# Patient Record
Sex: FEMALE | Race: WHITE | Employment: FULL TIME | URBAN - NONMETROPOLITAN AREA
[De-identification: names, ages, dates, MRNs, and addresses within clinical notes are randomized per-mention and may not be internally consistent; named-entity substitution may affect disease eponyms.]

---

## 2017-03-16 ENCOUNTER — OFFICE VISIT (OUTPATIENT)
Dept: FAMILY MEDICINE CLINIC | Age: 52
End: 2017-03-16

## 2017-03-16 VITALS
BODY MASS INDEX: 39.71 KG/M2 | HEART RATE: 60 BPM | HEIGHT: 64 IN | RESPIRATION RATE: 12 BRPM | SYSTOLIC BLOOD PRESSURE: 122 MMHG | DIASTOLIC BLOOD PRESSURE: 63 MMHG | TEMPERATURE: 98.2 F | WEIGHT: 232.6 LBS

## 2017-03-16 DIAGNOSIS — M54.50 ACUTE LEFT-SIDED LOW BACK PAIN WITHOUT SCIATICA: Primary | ICD-10-CM

## 2017-03-16 DIAGNOSIS — I10 ESSENTIAL HYPERTENSION: ICD-10-CM

## 2017-03-16 PROBLEM — F32.A DEPRESSION: Status: ACTIVE | Noted: 2017-03-16

## 2017-03-16 PROCEDURE — 96372 THER/PROPH/DIAG INJ SC/IM: CPT | Performed by: NURSE PRACTITIONER

## 2017-03-16 PROCEDURE — 99203 OFFICE O/P NEW LOW 30 MIN: CPT | Performed by: NURSE PRACTITIONER

## 2017-03-16 RX ORDER — LISINOPRIL 20 MG/1
20 TABLET ORAL DAILY
COMMUNITY
End: 2017-06-15 | Stop reason: ALTCHOICE

## 2017-03-16 RX ORDER — ETODOLAC 400 MG/1
400 TABLET, FILM COATED ORAL 2 TIMES DAILY
Qty: 60 TABLET | Refills: 0 | Status: SHIPPED | OUTPATIENT
Start: 2017-03-16 | End: 2017-06-15 | Stop reason: ALTCHOICE

## 2017-03-16 RX ORDER — KETOROLAC TROMETHAMINE 30 MG/ML
30 INJECTION, SOLUTION INTRAMUSCULAR; INTRAVENOUS ONCE
Status: COMPLETED | OUTPATIENT
Start: 2017-03-16 | End: 2017-03-16

## 2017-03-16 RX ADMIN — KETOROLAC TROMETHAMINE 30 MG: 30 INJECTION, SOLUTION INTRAMUSCULAR; INTRAVENOUS at 18:24

## 2017-03-17 ENCOUNTER — TELEPHONE (OUTPATIENT)
Dept: FAMILY MEDICINE CLINIC | Age: 52
End: 2017-03-17

## 2017-05-23 ENCOUNTER — TELEPHONE (OUTPATIENT)
Dept: FAMILY MEDICINE CLINIC | Age: 52
End: 2017-05-23

## 2017-05-23 DIAGNOSIS — E78.00 PURE HYPERCHOLESTEROLEMIA: Primary | ICD-10-CM

## 2017-05-23 PROBLEM — I10 ESSENTIAL HYPERTENSION: Chronic | Status: ACTIVE | Noted: 2017-03-16

## 2017-05-23 PROBLEM — F32.A DEPRESSION: Chronic | Status: ACTIVE | Noted: 2017-03-16

## 2017-05-23 RX ORDER — ATORVASTATIN CALCIUM 20 MG/1
20 TABLET, FILM COATED ORAL DAILY
Qty: 30 TABLET | Refills: 11 | Status: SHIPPED | OUTPATIENT
Start: 2017-05-23 | End: 2017-09-05 | Stop reason: SDUPTHER

## 2017-06-15 ENCOUNTER — OFFICE VISIT (OUTPATIENT)
Dept: FAMILY MEDICINE CLINIC | Age: 52
End: 2017-06-15

## 2017-06-15 VITALS
RESPIRATION RATE: 20 BRPM | HEART RATE: 81 BPM | DIASTOLIC BLOOD PRESSURE: 80 MMHG | WEIGHT: 235 LBS | SYSTOLIC BLOOD PRESSURE: 138 MMHG | BODY MASS INDEX: 43.24 KG/M2 | HEIGHT: 62 IN | TEMPERATURE: 98 F

## 2017-06-15 DIAGNOSIS — E66.01 MORBID OBESITY WITH BMI OF 40.0-44.9, ADULT (HCC): ICD-10-CM

## 2017-06-15 DIAGNOSIS — K21.9 GASTROESOPHAGEAL REFLUX DISEASE, ESOPHAGITIS PRESENCE NOT SPECIFIED: ICD-10-CM

## 2017-06-15 DIAGNOSIS — F32.5 MAJOR DEPRESSIVE DISORDER WITH SINGLE EPISODE, IN FULL REMISSION (HCC): ICD-10-CM

## 2017-06-15 DIAGNOSIS — L91.8 SKIN TAG: ICD-10-CM

## 2017-06-15 DIAGNOSIS — E78.00 PURE HYPERCHOLESTEROLEMIA: Primary | ICD-10-CM

## 2017-06-15 DIAGNOSIS — M47.816 SPONDYLOSIS OF LUMBAR REGION WITHOUT MYELOPATHY OR RADICULOPATHY: ICD-10-CM

## 2017-06-15 PROCEDURE — 99214 OFFICE O/P EST MOD 30 MIN: CPT | Performed by: NURSE PRACTITIONER

## 2017-06-15 PROCEDURE — 17110 DESTRUCTION B9 LES UP TO 14: CPT | Performed by: NURSE PRACTITIONER

## 2017-06-15 RX ORDER — OMEPRAZOLE 10 MG/1
10 CAPSULE, DELAYED RELEASE ORAL DAILY
Qty: 90 CAPSULE | Refills: 3 | Status: SHIPPED | OUTPATIENT
Start: 2017-06-15 | End: 2017-06-15 | Stop reason: ALTCHOICE

## 2017-06-15 RX ORDER — CITALOPRAM 40 MG/1
40 TABLET ORAL DAILY
Qty: 90 TABLET | Refills: 3 | Status: SHIPPED | OUTPATIENT
Start: 2017-06-15 | End: 2018-01-04 | Stop reason: SDUPTHER

## 2017-06-15 RX ORDER — OMEPRAZOLE 10 MG/1
10 CAPSULE, DELAYED RELEASE ORAL DAILY
Qty: 90 CAPSULE | Refills: 3 | Status: SHIPPED | OUTPATIENT
Start: 2017-06-15 | End: 2017-07-12 | Stop reason: SDUPTHER

## 2017-06-15 RX ORDER — IBUPROFEN 800 MG/1
800 TABLET ORAL EVERY 6 HOURS PRN
Qty: 180 TABLET | Refills: 3 | Status: SHIPPED | OUTPATIENT
Start: 2017-06-15 | End: 2019-09-26 | Stop reason: SDUPTHER

## 2017-06-15 ASSESSMENT — PATIENT HEALTH QUESTIONNAIRE - PHQ9
1. LITTLE INTEREST OR PLEASURE IN DOING THINGS: 0
2. FEELING DOWN, DEPRESSED OR HOPELESS: 0
SUM OF ALL RESPONSES TO PHQ QUESTIONS 1-9: 0
SUM OF ALL RESPONSES TO PHQ9 QUESTIONS 1 & 2: 0

## 2017-07-12 ENCOUNTER — TELEPHONE (OUTPATIENT)
Dept: FAMILY MEDICINE CLINIC | Age: 52
End: 2017-07-12

## 2017-07-12 DIAGNOSIS — K21.9 GASTROESOPHAGEAL REFLUX DISEASE, ESOPHAGITIS PRESENCE NOT SPECIFIED: Primary | ICD-10-CM

## 2017-07-12 RX ORDER — OMEPRAZOLE 10 MG/1
10 CAPSULE, DELAYED RELEASE ORAL DAILY
Qty: 90 CAPSULE | Refills: 3 | Status: SHIPPED | OUTPATIENT
Start: 2017-07-12 | End: 2018-01-04 | Stop reason: ALTCHOICE

## 2017-08-18 ENCOUNTER — TELEPHONE (OUTPATIENT)
Dept: FAMILY MEDICINE CLINIC | Age: 52
End: 2017-08-18

## 2017-09-02 ENCOUNTER — HOSPITAL ENCOUNTER (OUTPATIENT)
Age: 52
Discharge: HOME | End: 2017-09-02
Payer: COMMERCIAL

## 2017-09-02 PROCEDURE — 80061 LIPID PANEL: CPT

## 2017-09-02 PROCEDURE — 36415 COLL VENOUS BLD VENIPUNCTURE: CPT

## 2017-09-03 LAB
CHOLESTEROL, TOTAL: 230 MG/DL (ref 100–199)
HDLC SERPL-MCNC: 38 MG/DL
LDL CHOLESTEROL CALCULATED: 156 MG/DL
TRIGL SERPL-MCNC: 179 MG/DL (ref 0–199)

## 2017-09-05 ENCOUNTER — TELEPHONE (OUTPATIENT)
Dept: FAMILY MEDICINE CLINIC | Age: 52
End: 2017-09-05

## 2017-09-05 DIAGNOSIS — E78.00 PURE HYPERCHOLESTEROLEMIA: ICD-10-CM

## 2017-09-05 RX ORDER — ATORVASTATIN CALCIUM 80 MG/1
80 TABLET, FILM COATED ORAL DAILY
Qty: 90 TABLET | Refills: 3 | Status: SHIPPED | OUTPATIENT
Start: 2017-09-05 | End: 2018-01-04 | Stop reason: SDUPTHER

## 2017-09-05 RX ORDER — ATORVASTATIN CALCIUM 80 MG/1
80 TABLET, FILM COATED ORAL DAILY
Qty: 90 TABLET | Refills: 3 | Status: SHIPPED | OUTPATIENT
Start: 2017-09-05 | End: 2017-09-05 | Stop reason: SDUPTHER

## 2017-10-05 ENCOUNTER — OFFICE VISIT (OUTPATIENT)
Dept: FAMILY MEDICINE CLINIC | Age: 52
End: 2017-10-05
Payer: COMMERCIAL

## 2017-10-05 VITALS
DIASTOLIC BLOOD PRESSURE: 82 MMHG | HEIGHT: 63 IN | RESPIRATION RATE: 16 BRPM | BODY MASS INDEX: 42.06 KG/M2 | WEIGHT: 237.4 LBS | SYSTOLIC BLOOD PRESSURE: 134 MMHG | TEMPERATURE: 98.2 F | HEART RATE: 100 BPM

## 2017-10-05 DIAGNOSIS — Z12.39 SCREENING FOR BREAST CANCER: ICD-10-CM

## 2017-10-05 DIAGNOSIS — Z23 NEED FOR PNEUMOCOCCAL VACCINATION: ICD-10-CM

## 2017-10-05 DIAGNOSIS — E66.01 MORBID OBESITY WITH BMI OF 40.0-44.9, ADULT (HCC): ICD-10-CM

## 2017-10-05 DIAGNOSIS — Z23 NEED FOR INFLUENZA VACCINATION: ICD-10-CM

## 2017-10-05 DIAGNOSIS — F32.1 MODERATE SINGLE CURRENT EPISODE OF MAJOR DEPRESSIVE DISORDER (HCC): Primary | ICD-10-CM

## 2017-10-05 DIAGNOSIS — M25.511 ACUTE PAIN OF RIGHT SHOULDER: ICD-10-CM

## 2017-10-05 PROBLEM — I10 ESSENTIAL HYPERTENSION: Status: RESOLVED | Noted: 2017-03-16 | Resolved: 2017-10-05

## 2017-10-05 PROCEDURE — 99214 OFFICE O/P EST MOD 30 MIN: CPT | Performed by: NURSE PRACTITIONER

## 2017-10-05 PROCEDURE — 90472 IMMUNIZATION ADMIN EACH ADD: CPT | Performed by: NURSE PRACTITIONER

## 2017-10-05 PROCEDURE — 90688 IIV4 VACCINE SPLT 0.5 ML IM: CPT | Performed by: NURSE PRACTITIONER

## 2017-10-05 PROCEDURE — 90471 IMMUNIZATION ADMIN: CPT | Performed by: NURSE PRACTITIONER

## 2017-10-05 PROCEDURE — 90732 PPSV23 VACC 2 YRS+ SUBQ/IM: CPT | Performed by: NURSE PRACTITIONER

## 2017-10-05 RX ORDER — PHENTERMINE HYDROCHLORIDE 37.5 MG/1
37.5 CAPSULE ORAL EVERY MORNING
Qty: 30 CAPSULE | Refills: 0 | Status: SHIPPED | OUTPATIENT
Start: 2017-10-05 | End: 2017-11-02 | Stop reason: SDUPTHER

## 2017-10-05 RX ORDER — TIZANIDINE 4 MG/1
4 TABLET ORAL EVERY 8 HOURS PRN
Qty: 30 TABLET | Refills: 1 | Status: SHIPPED | OUTPATIENT
Start: 2017-10-05 | End: 2019-06-06 | Stop reason: SDUPTHER

## 2017-10-05 NOTE — PATIENT INSTRUCTIONS
right shoulder rise as you tip your head to the left. 7. Hold for 15 to 30 seconds. 8. Repeat 2 to 4 times to each side. Neck rotation    1. Sit in a firm chair, or stand up straight. 2. Keeping your chin level, turn your head to the right, and hold for 15 to 30 seconds. 3. Turn your head to the left, and hold for 15 to 30 seconds. 4. Repeat 2 to 4 times to each side. Follow-up care is a key part of your treatment and safety. Be sure to make and go to all appointments, and call your doctor if you are having problems. It's also a good idea to know your test results and keep a list of the medicines you take. Where can you learn more? Go to https://Innovand.Floxx. org and sign in to your [employer] account. Enter J896 in the Thingies box to learn more about \"Rhomboid Muscle Strain: Rehab Exercises. \"     If you do not have an account, please click on the \"Sign Up Now\" link. Current as of: March 21, 2017  Content Version: 11.3  © 0408-4512 [employer]. Care instructions adapted under license by [facility] ([facility]). If you have questions about a medical condition or this instruction, always ask your healthcare professional. [address] any warranty or liability for your use of this information.

## 2017-10-05 NOTE — MR AVS SNAPSHOT
After Visit Summary             Forest Quiles   10/5/2017 6:00 PM   Office Visit    Description:  Female : 1965   Provider:  Tay He CNP   Department:  [facility] and Future Appointments         Below is a list of your follow-up and future appointments. This may not be a complete list as you may have made appointments directly with providers that we are not aware of or your providers may have made some for you. Please call your providers to confirm appointments. It is important to keep your appointments. Please bring your current insurance card, photo ID, co-pay, and all medication bottles to your appointment. If self-pay, payment is expected at the time of service. Your To-Do List     Future Orders Complete By Expires    [employer] DIGITAL SCREEN W CAD BILATERAL [ Custom]  10/5/2018 2018    Follow-Up    Return in about 4 weeks (around 2017) for weight check adipex. Information from Your Visit        Department     Name Address Phone Fax    [address]  [address] [address] [phone]      You Were Seen for:         Comments    Moderate single current episode of major depressive disorder [facility])   [[id number]]         Vital Signs     Blood Pressure Pulse Temperature Respirations Height Weight    134/82 (Site: Right Arm, Position: Sitting) 100 98.2 °F (36.8 °C) (Oral) 16 5' 3\" (1.6 m) 237 lb 6.4 oz (107.7 kg)    Body Mass Index Smoking Status                42.05 kg/m2 Current Every Day Smoker          Additional Information about your Body Mass Index (BMI)           Your BMI as listed above is considered obese (30 or more). BMI is an estimate of body fat, calculated from your height and weight.   The higher your BMI, the greater your risk of heart disease, high blood pressure, type 2 diabetes, stroke, gallstones, arthritis, sleep apnea, and certain cancers. BMI is not perfect. It may overestimate body fat in athletes and people who are more muscular. Even a small weight loss (between 5 and 10 percent of your current weight) by decreasing your calorie intake and becoming more physically active will help lower your risk of developing or worsening diseases associated with obesity. Learn more at: Industry Dive.uk          Instructions    You may receive a survey about your visit with us today. The feedback from our patients helps us identify what is working well and where the service to all patients can be enhanced. Thank you! Shoulder Stretches: Exercises  Your Care Instructions  Here are some examples of exercises for your shoulder. Start each exercise slowly. Ease off the exercise if you start to have pain. Your doctor or physical therapist will tell you when you can start these exercises and which ones will work best for you. How to do the exercises  Note: These exercises should cause you to feel a gentle stretch, but no pain. Shoulder stretch    1.  a doorway and place one arm against the door frame. Your elbow should be a little higher than your shoulder. 2. Relax your shoulders as you lean forward, allowing your chest and shoulder muscles to stretch. You can also turn your body slightly away from your arm to stretch the muscles even more. 3. Hold for 15 to 30 seconds. 4. Repeat 2 to 4 times with each arm. Shoulder and chest stretch    Shoulder and chest stretch  1. While sitting, relax your upper body so you slump slightly in your chair. 2. As you breathe in, straighten your back and open your arms out to the sides. 3. Gently pull your shoulder blades back and downward. 4. Hold for 15 to 30 seconds as your breathe normally. 5. Repeat 2 to 4 times. Overhead stretch    1. Reach up over your head with both arms. 2. Hold for 15 to 30 seconds. 3. Repeat 2 to 4 times. Follow-up care is a key part of your treatment and safety. Be sure to make and go to all appointments, and call your doctor if you are having problems. It's also a good idea to know your test results and keep a list of the medicines you take. Where can you learn more? Go to https://chpepiceweb.Lucky Ant. org and sign in to your Grow[employer]ht account. Enter S254 in the Nimbix box to learn more about \"Shoulder Stretches: Exercises. \"     If you do not have an account, please click on the \"Sign Up Now\" link. Current as of: March 21, 2017  Content Version: 11.3  © 9321-1844 [employer]. Care instructions adapted under license by [facility] [employer] [facility] ([facility]). If you have questions about a medical condition or this instruction, always ask your healthcare professional. [address] any warranty or liability for your use of this information. Rhomboid Muscle Strain: Rehab Exercises  Your Care Instructions  Here are some examples of typical rehabilitation exercises for your condition. Start each exercise slowly. Ease off the exercise if you start to have pain. Your doctor or physical therapist will tell you when you can start these exercises and which ones will work best for you. How to do the exercises  Lower neck and upper back (rhomboid) stretch    5. Stretch your arms out in front of your body. Clasp one hand on top of your other hand. 6. Gently reach out so that you feel your shoulder blades stretching away from each other. 7. Gently bend your head forward. 8. Hold for 15 to 30 seconds. 9. Repeat 2 to 4 times. Resisted rows    Note: For this exercise, you will need elastic exercise material, such as surgical tubing or Thera-Band. 6. Put the band around a solid object, such as a bedpost, at about waist level. Stand facing where you have placed the band. Hold equal lengths of the band in each hand. 7. Start with your arms held out in front of you. 8. Pull the bands back, and move your shoulder blades together. As you finish, your elbows should be at your side and bent at 90 degrees (like the angle of the letter \"L\"). 9. Return to the starting position. 10. Repeat 8 to 12 times. Neck stretches    4. Look straight ahead, and tip your right ear to your right shoulder. Do not let your left shoulder rise as you tip your head to the right. 5. Hold for 15 to 30 seconds. 6. Tilt your head to the left. Do not let your right shoulder rise as you tip your head to the left. 7. Hold for 15 to 30 seconds. 8. Repeat 2 to 4 times to each side. Neck rotation    1. Sit in a firm chair, or stand up straight. 2. Keeping your chin level, turn your head to the right, and hold for 15 to 30 seconds. 3. Turn your head to the left, and hold for 15 to 30 seconds. 4. Repeat 2 to 4 times to each side. Follow-up care is a key part of your treatment and safety. Be sure to make and go to all appointments, and call your doctor if you are having problems. It's also a good idea to know your test results and keep a list of the medicines you take. Where can you learn more? Go to https://Soshowise.Long Play. org and sign in to your [employer] account. Enter W221 in the Visionary Mobile box to learn more about \"Rhomboid Muscle Strain: Rehab Exercises. \"     If you do not have an account, please click on the \"Sign Up Now\" link. Current as of: March 21, 2017  Content Version: 11.3  © 8724-5486 [employer]. Care instructions adapted under license by [facility] ([facility]). If you have questions about a medical condition or this instruction, always ask your healthcare professional. [place] [address] any warranty or liability for your use of this information. Today's Medication Changes          These changes are accurate as of: 10/5/17  6:12 PM.  If you have any questions, ask your nurse or doctor. Preventive Care        Date Due    Hepatitis C screening is recommended for all adults regardless of risk factors born between [facility] at least once (lifetime) who have never been tested. 1965    HIV screening is recommended for all people regardless of risk factors  aged 15-65 years at least once (lifetime) who have never been HIV tested. 3/3/1980    Tetanus Combination Vaccine (1 - Tdap) 3/3/1984    Pneumococcal Vaccine - Pneumovax for adults aged 19-64 years with: chronic heart disease, chronic lung disease, diabetes mellitus, alcoholism, chronic liver disease, or cigarette smoking. (1 of 1 - PPSV23) 3/3/1984    Pap Smear 3/3/1986    Mammograms are recommended every 2 years for low/average risk patients aged 48 - 69, and every year for high risk patients per updated national guidelines. However these guidelines can be individualized by your provider. 3/3/2015    Yearly Flu Vaccine (1) 9/1/2017    Colonoscopy 3/5/2022    Cholesterol Screening 9/2/2022            MyChart Signup           Our records indicate that you have declined MyChart signup.

## 2017-10-05 NOTE — PROGRESS NOTES
Chief Complaint   Patient presents with    Follow-up     3 month f/u for weight gain, pt states that she is very fatigued and feels like she has no energy at the end of the day    Shoulder Pain     right shoulder pain, no injury noted, x 1 month, pt describes her pain as achey with occassional sharp pain, loss of ROM in shoulder, pain radiates across shoudler blades        History obtained from chart review and the patient. SUBJECTIVE:  Nneka Ayala is a 46 y.o. female that presents today for follow up weight gain. Is frustrated with her weight because she hasn't been able to exercise because of her right shoulder. She bought an exercise bike but hasn't been able to use it because of her right shoulder. She is watching diet, healthy choices. She is working 14 hours of work a day, is trying to walk daily. Walking about 15 minutes/day. HPI:    The patient presents to the office for a refill on phentermine (Adipex). This is month number 0 for this series. The patient has gained 2 pounds since the last visit. The patient has been prescribed phentermine as part of a weight loss regimen which also includes dietary restrictions and structured exercise program.  The patient reports compliance with the dietary restrictions is good. The patient reports an exercise regimen which includes walking    Patient reports no adverse side effects from current medication regimen. ROS:  Gen: no fevers, chills, sweats. Cardiac: No chest pain, palpitations, syncope, lightheadedness. Pulmonary: No wheezes or dyspnea. GI: No nausea, vomiting, diarrhea, abdominal discomfort. Neuro: No tremors, headaches. Psych: No agitation or insomnia. Wt Readings from Last 3 Encounters:   10/05/17 237 lb 6.4 oz (107.7 kg)   06/15/17 235 lb (106.6 kg)   03/16/17 232 lb 9.6 oz (105.5 kg)         Shoulder Pain    HPI:    Symptoms have been present for 1 month(s) in the right shoulder. The pain is constant.  The patient describes the pain as aching and sharp / stabbing. Inciting injury or history of trauma? No  Pain is relieved by - nothing  Pain is aggravated by - sleeping on her right side, lifting arm up  Radiation of the pain? Yes - will radiate down her arm  Paresthesias of the extremities? No  Decreased ROM? Yes  Treatments tried - ibuprofen, stretches    Has been crying at the drop of a hat, very tearful. Energy has been poor. Motivation has been nil. She reports that the only thing that has changed is her work, she has been working a lot, driving a lot with work. A lot of stress and frustration with work. Denies SI/HI. Feels depressed probably 5-6 days out of the week. She is currently taking 20 mg (is cutting the 40 mg tabs of Celexa in half).     Age/Gender Health Maintenance    Lipid - 5/17  DM Screen - 5/17  Colon Cancer Screening - had 6-7 years Dr. Melissa Sadler (Age 54 to [de-identified] with 30 pack year hx, current smoker or quit within past 15 years) - n/a    Tetanus - needs  Influenza Vaccine - needs  Pneumonia Vaccine - n/a  Zostavax - n/a   HPV Vaccine - n/a    Breast Cancer Screening - UTD  Cervical Cancer Screening - UTD  Osteoporosis Screening - n/a  Chlamydia Screen - n/a    PSA testing discussion - n/a  AAA Screening - n/a    Falls screening - n/a    Current Outpatient Prescriptions   Medication Sig Dispense Refill    phentermine 37.5 MG capsule Take 1 capsule by mouth every morning 30 capsule 0    tiZANidine (ZANAFLEX) 4 MG tablet Take 1 tablet by mouth every 8 hours as needed (right shoulder pain) 30 tablet 1    atorvastatin (LIPITOR) 80 MG tablet Take 1 tablet by mouth daily 90 tablet 3    omeprazole (PRILOSEC) 10 MG delayed release capsule Take 1 capsule by mouth daily 90 capsule 3    citalopram (CELEXA) 40 MG tablet Take 1 tablet by mouth daily 90 tablet 3    ibuprofen (ADVIL;MOTRIN) 800 MG tablet Take 1 tablet by mouth every 6 hours as needed for Pain 180 tablet 3    [DISCONTINUED] lisinopril (PRINIVIL;ZESTRIL) 20 MG tablet Take 20 mg by mouth daily       No current facility-administered medications for this visit. Orders Placed This Encounter   Medications    phentermine 37.5 MG capsule     Sig: Take 1 capsule by mouth every morning     Dispense:  30 capsule     Refill:  0    tiZANidine (ZANAFLEX) 4 MG tablet     Sig: Take 1 tablet by mouth every 8 hours as needed (right shoulder pain)     Dispense:  30 tablet     Refill:  1         All medications reviewed and reconciled, including OTC and herbal medications. Updated list given to patient. Patient Active Problem List   Diagnosis    Depression    Hyperlipidemia       Past Medical History:   Diagnosis Date    Depression     Hyperlipidemia     Hypertension        Past Surgical History:   Procedure Laterality Date    ENDOMETRIAL ABLATION      OVARY REMOVAL      left    TONSILLECTOMY         Allergies   Allergen Reactions    Flagyl [Metronidazole] Nausea And Vomiting       Social History     Social History    Marital status:      Spouse name: N/A    Number of children: N/A    Years of education: N/A     Occupational History    Not on file. Social History Main Topics    Smoking status: Current Every Day Smoker     Packs/day: 0.50    Smokeless tobacco: Not on file    Alcohol use Yes    Drug use: No    Sexual activity: Not on file     Other Topics Concern    Not on file     Social History Narrative       No family history on file. I have reviewed the patient's past medical history, past surgical history, allergies, medications, social and family history and I have made updates where appropriate.       Review of Systems  Positive responses are highlighted in bold    Constitutional:  Fever, Chills, Night Sweats, Fatigue, Unexpected changes in weight  Eyes:  Eye discharge, Eye pain, Eye redness, Visual disturbances   HENT:  Ear pain, Tinnitus, Nosebleeds, Trouble swallowing, Hearing loss, Sore throat  Cardiovascular:  Chest Pain, Palpitations, Orthopnea, Paroxysmal Nocturnal Dyspnea  Respiratory:  Cough, Wheezing, Shortness of breath, Chest tightness, Apnea  Gastrointestinal:  Nausea, Vomiting, Diarrhea, Constipation, Heartburn, Blood in stool  Genitourinary:  Difficulty or painful urination, Flank pain, Change in frequency, Urgency  Skin:  Color change, Rash, Itching, Wound  Psychiatric:  Hallucinations, Anxiety, Depression, Suicidal ideation  Hematological:  Enlarged glands, Easy bleeding, Easily bruising  Musculoskeletal:  Joint pain, Back pain, Gait problems, Joint swelling, Myalgias  Neurological:  Dizziness, Headaches, Presyncope, Numbness, Seizures, Tremors  Allergy:  Environmental allergies, Food allergies  Endocrine:  Heat Intolerance, Cold Intolerance, Polydipsia, Polyphagia, Polyuria    Lab Results   Component Value Date     05/20/2017    K 4.5 05/20/2017     05/20/2017    CO2 25 05/20/2017    BUN 12 05/20/2017    CREATININE 0.7 05/20/2017    GLUCOSE 96 05/20/2017    CALCIUM 9.5 05/20/2017    PROT 7.0 03/15/2017    LABALBU 4.1 03/15/2017    BILITOT 0.5 03/15/2017    ALKPHOS 97 03/15/2017    AST 14 03/15/2017    ALT 20 03/15/2017    LABGLOM 88 (A) 05/20/2017     Lab Results   Component Value Date    CHOL 230 (H) 09/02/2017    CHOL 215 (H) 05/20/2017     Lab Results   Component Value Date    TRIG 179 09/02/2017    TRIG 155 05/20/2017     Lab Results   Component Value Date    HDL 38 09/02/2017    HDL 38 05/20/2017     Lab Results   Component Value Date    LDLCALC 156 09/02/2017    LDLCALC 146 05/20/2017     Lab Results   Component Value Date    LABA1C 5.3 07/01/2017         PHYSICAL EXAM:  Vitals:    10/05/17 1745   BP: 134/82   Site: Right Arm   Position: Sitting   Pulse: 100   Resp: 16   Temp: 98.2 °F (36.8 °C)   TempSrc: Oral   Weight: 237 lb 6.4 oz (107.7 kg)   Height: 5' 3\" (1.6 m)     Body mass index is 42.05 kg/(m^2).          VS Reviewed  General Appearance: A&O x 3, No acute distress,well developed and well- nourished  Head: normocephalic and atraumatic  Eyes: pupils equal, round, and reactive to light, extraocular eye movements intact, conjunctivae and eye lids without erythema  Neck: supple and non-tender without mass, no thyromegaly or thyroid nodules, no cervical lymphadenopathy  Pulmonary/Chest: clear to auscultation bilaterally- no wheezes, rales or rhonchi, normal air movement, no respiratory distress or retractions  Cardiovascular: S1 and S2 auscultated w/ RRR. No murmurs, rubs, clicks, or gallops, distal pulses intact. Abdomen: soft, non-tender, non-distended, bowl sounds physiologic,  no rebound or guarding, no masses or hernias noted. Liver and spleen without enlargement. Extremities: no cyanosis, clubbing or edema of the lower extremities  Musculoskeletal: No joint swelling or gross deformity   Right shoulder: limited ROM, unable to lift arm but to 90 degrees, negative empty can and apley scratch, pain over posterior aspect of shoulder, + muscle spasm  Neuro:  Alert, 5/5 strength globally and symmetrically  Psych: Affect appropriate. Mood normal. Thought process is normal without evidence of depression or psychosis. Good insight and appropriate interaction. Cognition and memory appear to be intact. Skin: warm and dry, no rash or erythema  Lymph:  No cervical, auricular or supraclavicular lymph nodes palpated      ASSESSMENT & PLAN  Jermaine was seen today for follow-up and shoulder pain. Diagnoses and all orders for this visit:    Moderate single current episode of major depressive disorder ([facility] [address]    Morbid obesity with BMI of 40.0-44.9, adult ([facility] [address]  -     phentermine 37.5 MG capsule; Take 1 capsule by mouth every morning    Acute pain of right shoulder  -     tiZANidine (ZANAFLEX) 4 MG tablet;  Take 1 tablet by mouth every 8 hours as needed (right shoulder pain)    Need for influenza vaccination  -     INFLUENZA, QUADV, 3 YRS AND OLDER, IM, MDV, 0.5ML (FLUZONE QUADV)    Need for pneumococcal vaccination  -     Pneumococcal polysaccharide vaccine 23-valent >= 3yo subcutaneous/IM (PNEUMOVAX 23)    Screening for breast cancer  -     [facility] DIGITAL SCREEN W CAD BILATERAL; Future      Discussed with patient to increase Celexa to 40 mg (already has Rx)  Home exercises for shoulder  Benefits risks and SE of medication discussed  Massage for shoulder, ice/heat and NSAIDS  I discussed the risks and benefits of adipex again with the patient today including increases in HR and BP, N/V, headaches, palpitations, insomnia and dry mouth. she stated today that she understands these risks and would like to continue with a prescription for the medication. DISPOSITION    Return in about 4 weeks (around 11/2/2017) for weight check adipex. Katrina Galicia released without restrictions. PATIENT COUNSELING    Counseling was provided today regarding the following topics: Healthy eating habits, Regular exercise, substance abuse and healthy sleep habits. Katrina Galicia received counseling on the following healthy behaviors: medication adherence    Patient given educational materials on: See Attached    I have instructed Katrina Galicia to complete a self tracking handout on none and instructed them to bring it with them to her next appointment. Barriers to learning and self management: none    Discussed use, benefit, and side effects of prescribed medications. Barriers to medication compliance addressed. All patient questions answered. Pt voiced understanding.        Electronically signed by Ivelisse Santiago CNP on 10/5/2017 at 6:11 PM

## 2017-11-02 ENCOUNTER — OFFICE VISIT (OUTPATIENT)
Dept: FAMILY MEDICINE CLINIC | Age: 52
End: 2017-11-02
Payer: COMMERCIAL

## 2017-11-02 VITALS
DIASTOLIC BLOOD PRESSURE: 80 MMHG | SYSTOLIC BLOOD PRESSURE: 128 MMHG | RESPIRATION RATE: 16 BRPM | BODY MASS INDEX: 42.69 KG/M2 | WEIGHT: 232 LBS | HEART RATE: 80 BPM | TEMPERATURE: 98.4 F | HEIGHT: 62 IN

## 2017-11-02 DIAGNOSIS — J01.90 ACUTE RHINOSINUSITIS: Primary | ICD-10-CM

## 2017-11-02 DIAGNOSIS — E66.01 MORBID OBESITY WITH BMI OF 40.0-44.9, ADULT (HCC): ICD-10-CM

## 2017-11-02 DIAGNOSIS — B00.1 RECURRENT COLD SORES: ICD-10-CM

## 2017-11-02 DIAGNOSIS — F32.4 MAJOR DEPRESSIVE DISORDER WITH SINGLE EPISODE, IN PARTIAL REMISSION (HCC): ICD-10-CM

## 2017-11-02 PROCEDURE — 99214 OFFICE O/P EST MOD 30 MIN: CPT | Performed by: NURSE PRACTITIONER

## 2017-11-02 RX ORDER — ACYCLOVIR 400 MG/1
400 TABLET ORAL 3 TIMES DAILY
Qty: 15 TABLET | Refills: 3 | Status: SHIPPED | OUTPATIENT
Start: 2017-11-02 | End: 2019-02-06 | Stop reason: SDUPTHER

## 2017-11-02 RX ORDER — FLUTICASONE PROPIONATE 50 MCG
2 SPRAY, SUSPENSION (ML) NASAL DAILY
Qty: 1 BOTTLE | Refills: 1 | Status: SHIPPED | OUTPATIENT
Start: 2017-11-02 | End: 2019-10-10 | Stop reason: ALTCHOICE

## 2017-11-02 RX ORDER — PHENTERMINE HYDROCHLORIDE 37.5 MG/1
37.5 CAPSULE ORAL EVERY MORNING
Qty: 30 CAPSULE | Refills: 0 | Status: SHIPPED | OUTPATIENT
Start: 2017-11-02 | End: 2017-11-30 | Stop reason: SDUPTHER

## 2017-11-02 RX ORDER — AMOXICILLIN AND CLAVULANATE POTASSIUM 875; 125 MG/1; MG/1
1 TABLET, FILM COATED ORAL 2 TIMES DAILY
Qty: 20 TABLET | Refills: 0 | Status: SHIPPED | OUTPATIENT
Start: 2017-11-02 | End: 2017-11-12

## 2017-11-02 NOTE — PROGRESS NOTES
Chief Complaint   Patient presents with    Follow-up     pt still she  feels better     Head Congestion     started last thursday     Cough       History obtained from chart review and the patient. SUBJECTIVE:  Makenzie Peck is a 46 y.o. female that presents today for follow up weight gain. Feels like her moods are much improved. Not as tearful, no problems with anxiety or feeling stressed. Feels depressed maybe 1-2 days out of the week. No SI/HI. Is sleeping fine, does have decreased appetite with the adipex. Reports that her  also notices a difference with the increased dose of Celexa. HPI:    The patient presents to the office for a refill on phentermine (Adipex). This is month number 1 for this series. The patient has lost 5 pounds since the last visit. The patient has been prescribed phentermine as part of a weight loss regimen which also includes dietary restrictions and structured exercise program.  The patient reports compliance with the dietary restrictions is good. Has cut out the soda and processed foods The patient reports an exercise regimen which includes walking which she is adherent to. Patient reports no adverse side effects from current medication regimen. ROS:  Gen: no fevers, chills, sweats. Cardiac: No chest pain, palpitations, syncope, lightheadedness. Pulmonary: No wheezes or dyspnea. GI: No nausea, vomiting, diarrhea, abdominal discomfort. Neuro: No tremors, headaches. Psych: No agitation or insomnia. Wt Readings from Last 3 Encounters:   11/02/17 232 lb (105.2 kg)   10/05/17 237 lb 6.4 oz (107.7 kg)   06/15/17 235 lb (106.6 kg)     URI Symptoms    HPI:      Symptoms have been present for 1 week(s). Symptoms are unchanged since they initially started. Has recurrent cold sores again with this illness. Fever? No  Runny nose or congestion? Yes   Cough? Yes - tickle in the throat  Sore throat?   Yes - started out with sore throat but this went away  Headache, fatigue, joint pains, muscle aches? Yes - some HA with coughing  Shortness of breath/Wheezing? No  Nausea/Vomiting/Diarrhea? No  Double Sickening? No  Sick contacts? Yes -     Patient has tried dayquil without improvement. Age/Gender Health Maintenance    Lipid - 5/17  DM Screen - 5/17  Colon Cancer Screening - had 6-7 years Dr. Niall Doss (Age 54 to [de-identified] with 30 pack year hx, current smoker or quit within past 15 years) - n/a    Tetanus - needs  Influenza Vaccine - needs  Pneumonia Vaccine - n/a  Zostavax - n/a   HPV Vaccine - n/a    Breast Cancer Screening - UTD  Cervical Cancer Screening - UTD  Osteoporosis Screening - n/a  Chlamydia Screen - n/a    PSA testing discussion - n/a  AAA Screening - n/a    Falls screening - n/a    Current Outpatient Prescriptions   Medication Sig Dispense Refill    phentermine 37.5 MG capsule Take 1 capsule by mouth every morning 30 capsule 0    amoxicillin-clavulanate (AUGMENTIN) 875-125 MG per tablet Take 1 tablet by mouth 2 times daily for 10 days 20 tablet 0    fluticasone (FLONASE) 50 MCG/ACT nasal spray 2 sprays by Nasal route daily 1 Bottle 1    acyclovir (ZOVIRAX) 400 MG tablet Take 1 tablet by mouth 3 times daily for 5 days 15 tablet 3    tiZANidine (ZANAFLEX) 4 MG tablet Take 1 tablet by mouth every 8 hours as needed (right shoulder pain) 30 tablet 1    atorvastatin (LIPITOR) 80 MG tablet Take 1 tablet by mouth daily 90 tablet 3    omeprazole (PRILOSEC) 10 MG delayed release capsule Take 1 capsule by mouth daily 90 capsule 3    citalopram (CELEXA) 40 MG tablet Take 1 tablet by mouth daily 90 tablet 3    ibuprofen (ADVIL;MOTRIN) 800 MG tablet Take 1 tablet by mouth every 6 hours as needed for Pain 180 tablet 3     No current facility-administered medications for this visit.       Orders Placed This Encounter   Medications    phentermine 37.5 MG capsule     Sig: Take 1 capsule by mouth every morning loss, Sore throat  Cardiovascular:  Chest Pain, Palpitations, Orthopnea, Paroxysmal Nocturnal Dyspnea  Respiratory:  Cough, Wheezing, Shortness of breath, Chest tightness, Apnea  Gastrointestinal:  Nausea, Vomiting, Diarrhea, Constipation, Heartburn, Blood in stool  Genitourinary:  Difficulty or painful urination, Flank pain, Change in frequency, Urgency  Skin:  Color change, Rash, Itching, Wound  Psychiatric:  Hallucinations, Anxiety, Depression, Suicidal ideation  Hematological:  Enlarged glands, Easy bleeding, Easily bruising  Musculoskeletal:  Joint pain, Back pain, Gait problems, Joint swelling, Myalgias  Neurological:  Dizziness, Headaches, Presyncope, Numbness, Seizures, Tremors  Allergy:  Environmental allergies, Food allergies  Endocrine:  Heat Intolerance, Cold Intolerance, Polydipsia, Polyphagia, Polyuria    Lab Results   Component Value Date     05/20/2017    K 4.5 05/20/2017     05/20/2017    CO2 25 05/20/2017    BUN 12 05/20/2017    CREATININE 0.7 05/20/2017    GLUCOSE 96 05/20/2017    CALCIUM 9.5 05/20/2017    PROT 7.0 03/15/2017    LABALBU 4.1 03/15/2017    BILITOT 0.5 03/15/2017    ALKPHOS 97 03/15/2017    AST 14 03/15/2017    ALT 20 03/15/2017    LABGLOM 88 (A) 05/20/2017     Lab Results   Component Value Date    CHOL 230 (H) 09/02/2017    CHOL 215 (H) 05/20/2017     Lab Results   Component Value Date    TRIG 179 09/02/2017    TRIG 155 05/20/2017     Lab Results   Component Value Date    HDL 38 09/02/2017    HDL 38 05/20/2017     Lab Results   Component Value Date    LDLCALC 156 09/02/2017    LDLCALC 146 05/20/2017     Lab Results   Component Value Date    LABA1C 5.3 07/01/2017         PHYSICAL EXAM:  Vitals:    11/02/17 1802   BP: 128/80   Pulse: 80   Resp: 16   Temp: 98.4 °F (36.9 °C)   TempSrc: Oral   Weight: 232 lb (105.2 kg)   Height: 5' 2.21\" (1.58 m)     Body mass index is 42.15 kg/m².          VS Reviewed  General Appearance: A&O x 3, No acute distress,well developed and well-

## 2017-11-27 ENCOUNTER — TELEPHONE (OUTPATIENT)
Dept: FAMILY MEDICINE CLINIC | Age: 52
End: 2017-11-27

## 2017-11-27 NOTE — TELEPHONE ENCOUNTER
11/27/17  Dolv: 11/2/17  Patient still with deep cough(dry), body aches and head hurts. She finished the Amoxicillin and felt better for few days and then started again last Wednesday. She has no energy and was in bed x 4 days, only getting up today as had to work. No temp. She is using Flonase and Dayquil. Please advise.   [facility] in [facility]   Thanks/blm

## 2017-11-27 NOTE — TELEPHONE ENCOUNTER
Pt is only able to come in on Thursday evenings because of her work schedule. There are no openings on Thursday evening till January, all others are booked or blocked off. Please advise.

## 2017-11-30 ENCOUNTER — OFFICE VISIT (OUTPATIENT)
Dept: FAMILY MEDICINE CLINIC | Age: 52
End: 2017-11-30
Payer: COMMERCIAL

## 2017-11-30 VITALS
DIASTOLIC BLOOD PRESSURE: 82 MMHG | HEART RATE: 100 BPM | WEIGHT: 226 LBS | TEMPERATURE: 97.9 F | HEIGHT: 63 IN | BODY MASS INDEX: 40.04 KG/M2 | SYSTOLIC BLOOD PRESSURE: 132 MMHG | RESPIRATION RATE: 18 BRPM

## 2017-11-30 DIAGNOSIS — J40 BRONCHITIS: Primary | ICD-10-CM

## 2017-11-30 DIAGNOSIS — E66.01 MORBID OBESITY WITH BMI OF 40.0-44.9, ADULT (HCC): ICD-10-CM

## 2017-11-30 DIAGNOSIS — Z72.0 TOBACCO ABUSE: ICD-10-CM

## 2017-11-30 PROCEDURE — 99213 OFFICE O/P EST LOW 20 MIN: CPT | Performed by: FAMILY MEDICINE

## 2017-11-30 RX ORDER — PREDNISONE 20 MG/1
40 TABLET ORAL DAILY
Qty: 10 TABLET | Refills: 0 | Status: SHIPPED | OUTPATIENT
Start: 2017-11-30 | End: 2017-12-05

## 2017-11-30 RX ORDER — PHENTERMINE HYDROCHLORIDE 37.5 MG/1
37.5 CAPSULE ORAL EVERY MORNING
Qty: 30 CAPSULE | Refills: 0 | Status: SHIPPED | OUTPATIENT
Start: 2017-11-30 | End: 2017-12-30

## 2017-11-30 RX ORDER — BENZONATATE 200 MG/1
200 CAPSULE ORAL 3 TIMES DAILY PRN
Qty: 30 CAPSULE | Refills: 0 | Status: SHIPPED | OUTPATIENT
Start: 2017-11-30 | End: 2017-12-10

## 2017-11-30 RX ORDER — LEVOFLOXACIN 500 MG/1
500 TABLET, FILM COATED ORAL DAILY
Qty: 10 TABLET | Refills: 0 | Status: SHIPPED | OUTPATIENT
Start: 2017-11-30 | End: 2017-12-10

## 2017-11-30 NOTE — PROGRESS NOTES
Visit Information    Have you changed or started any medications since your last visit including any over-the-counter medicines, vitamins, or herbal medicines? no   Are you having any side effects from any of your medications? -  no  Have you stopped taking any of your medications? Is so, why? -  yes - finished antibiotic     Have you seen any other physician or provider since your last visit? No  Have you had any other diagnostic tests since your last visit? No  Have you been seen in the emergency room and/or had an admission to a hospital since we last saw you? No  Have you had your routine dental cleaning in the past 6 months? no    Have you activated your [employer] account? If not, what are your barriers?  No     Patient Care Team:  Simon Turcios CNP as PCP - General (Family Medicine)    Medical History Review  Past Medical, Family, and Social History reviewed and does contribute to the patient presenting condition    Health Maintenance   Topic Date Due    Hepatitis C screen  1965    HIV screen  03/03/1980    DTaP/Tdap/Td vaccine (1 - Tdap) 03/03/1984    Cervical cancer screen  03/03/1986    Breast cancer screen  03/03/2015    Colon cancer screen colonoscopy  03/05/2022    Lipid screen  09/02/2022    Flu vaccine  Completed    Pneumococcal med risk  Completed
wheezes, rales or rhonchi, symmetric air entry. Abdominal exam: soft, nontender, nondistended, no masses or organomegaly. Extremities:  No clubbing, cyanosis or edema  Skin exam - normal coloration and turgor, no rashes, no suspicious skin lesions noted. Psych -  Affect appropriate. Thought process is normal without evidence of depression or psychosis. Good insight and appropriae interaction. Cognition and memory appear to be intact. ASSESSMENT & PLAN  Reji Dawkins was seen today for congestion and other. Diagnoses and all orders for this visit:    Bronchitis  -     levofloxacin (LEVAQUIN) 500 MG tablet; Take 1 tablet by mouth daily for 10 days  -     predniSONE (DELTASONE) 20 MG tablet; Take 2 tablets by mouth daily for 5 days  -     benzonatate (TESSALON) 200 MG capsule; Take 1 capsule by mouth 3 times daily as needed for Cough    Morbid obesity with BMI of 40.0-44.9, adult (HCC)  -     phentermine 37.5 MG capsule; Take 1 capsule by mouth every morning  BMI 40.68 on 11/30/17. Tobacco abuse        Return if symptoms worsen or fail to improve. -Advised to call in 4 days if no significant improvement  -Patient advised to call immediately or go to ER if any worsening of symptoms  -Patient counseled on conservative care including fluids, rest and OTC meds    Reji Dawkins received counseling on the following healthy behaviors: medication adherence  Reviewed prior labs and health maintenance. Continue current medications, diet and exercise. Discussed use, benefit, and side effects of prescribed medications. Barriers to medication compliance addressed. Patient given educational materials - see patient instructions. All patient questions answered. Patient voiced understanding. I have reviewed this patient's history, habits, and medication list and have updated the chart where appropriate.

## 2018-01-04 ENCOUNTER — OFFICE VISIT (OUTPATIENT)
Dept: FAMILY MEDICINE CLINIC | Age: 53
End: 2018-01-04
Payer: COMMERCIAL

## 2018-01-04 VITALS
HEART RATE: 72 BPM | WEIGHT: 229 LBS | BODY MASS INDEX: 42.14 KG/M2 | RESPIRATION RATE: 16 BRPM | HEIGHT: 62 IN | TEMPERATURE: 98 F | SYSTOLIC BLOOD PRESSURE: 136 MMHG | DIASTOLIC BLOOD PRESSURE: 74 MMHG

## 2018-01-04 DIAGNOSIS — R05.3 CHRONIC COUGH: Primary | ICD-10-CM

## 2018-01-04 DIAGNOSIS — E78.00 PURE HYPERCHOLESTEROLEMIA: ICD-10-CM

## 2018-01-04 DIAGNOSIS — F32.5 MAJOR DEPRESSIVE DISORDER WITH SINGLE EPISODE, IN FULL REMISSION (HCC): ICD-10-CM

## 2018-01-04 DIAGNOSIS — R53.82 CHRONIC FATIGUE: ICD-10-CM

## 2018-01-04 DIAGNOSIS — K21.9 GASTROESOPHAGEAL REFLUX DISEASE, ESOPHAGITIS PRESENCE NOT SPECIFIED: ICD-10-CM

## 2018-01-04 DIAGNOSIS — R06.09 EXERTIONAL DYSPNEA: ICD-10-CM

## 2018-01-04 PROCEDURE — 99214 OFFICE O/P EST MOD 30 MIN: CPT | Performed by: NURSE PRACTITIONER

## 2018-01-04 RX ORDER — ATORVASTATIN CALCIUM 80 MG/1
80 TABLET, FILM COATED ORAL DAILY
Qty: 90 TABLET | Refills: 3 | Status: SHIPPED | OUTPATIENT
Start: 2018-01-04 | End: 2019-03-07 | Stop reason: SDUPTHER

## 2018-01-04 RX ORDER — ALBUTEROL SULFATE 90 UG/1
2 AEROSOL, METERED RESPIRATORY (INHALATION) EVERY 6 HOURS PRN
Qty: 1 INHALER | Refills: 3 | Status: SHIPPED | OUTPATIENT
Start: 2018-01-04 | End: 2020-10-01 | Stop reason: ALTCHOICE

## 2018-01-04 RX ORDER — CITALOPRAM 40 MG/1
40 TABLET ORAL DAILY
Qty: 90 TABLET | Refills: 3 | Status: SHIPPED | OUTPATIENT
Start: 2018-01-04 | End: 2019-03-07 | Stop reason: SDUPTHER

## 2018-01-04 RX ORDER — OMEPRAZOLE 20 MG/1
20 CAPSULE, DELAYED RELEASE ORAL DAILY
Qty: 90 CAPSULE | Refills: 3 | Status: SHIPPED | OUTPATIENT
Start: 2018-01-04 | End: 2019-03-07 | Stop reason: SDUPTHER

## 2018-01-04 NOTE — PROGRESS NOTES
Refill:  3         All medications reviewed and reconciled, including OTC and herbal medications. Updated list given to patient. Patient Active Problem List   Diagnosis    Depression    Hyperlipidemia       Past Medical History:   Diagnosis Date    Depression     Hyperlipidemia     Hypertension        Past Surgical History:   Procedure Laterality Date    ENDOMETRIAL ABLATION      OVARY REMOVAL      left    TONSILLECTOMY         Allergies   Allergen Reactions    Flagyl [Metronidazole] Nausea And Vomiting       Social History     Social History    Marital status:      Spouse name: N/A    Number of children: N/A    Years of education: N/A     Occupational History    Not on file. Social History Main Topics    Smoking status: Current Every Day Smoker     Packs/day: 0.50    Smokeless tobacco: Never Used    Alcohol use Yes    Drug use: No    Sexual activity: Not on file     Other Topics Concern    Not on file     Social History Narrative    No narrative on file       No family history on file. I have reviewed the patient's past medical history, past surgical history, allergies, medications, social and family history and I have made updates where appropriate.       Review of Systems  Positive responses are highlighted in bold    Constitutional:  Fever, Chills, Night Sweats, Fatigue, Unexpected changes in weight  Eyes:  Eye discharge, Eye pain, Eye redness, Visual disturbances   HENT:  Ear pain, Tinnitus, Nosebleeds, Trouble swallowing, Hearing loss, Sore throat  Cardiovascular:  Chest Pain, Palpitations, Orthopnea, Paroxysmal Nocturnal Dyspnea  Respiratory:  Cough, Wheezing, Shortness of breath, Chest tightness, Apnea  Gastrointestinal:  Nausea, Vomiting, Diarrhea, Constipation, Heartburn, Blood in stool  Genitourinary:  Difficulty or painful urination, Flank pain, Change in frequency, Urgency  Skin:  Color change, Rash, Itching, Wound  Psychiatric:  Hallucinations, Anxiety, distal pulses intact. Abdomen: soft, non-tender, non-distended, bowl sounds physiologic,  no rebound or guarding, no masses or hernias noted. Liver and spleen without enlargement. Extremities: no cyanosis, clubbing or edema of the lower extremities  Musculoskeletal: No joint swelling or gross deformity   Neuro:  Alert, 5/5 strength globally and symmetrically  Psych: Affect appropriate. Mood normal. Thought process is normal without evidence of depression or psychosis. Good insight and appropriate interaction. Cognition and memory appear to be intact. Skin: warm and dry  Lymph:  No cervical, auricular or supraclavicular lymph nodes palpated      ASSESSMENT & PLAN  Peter Jean was seen today for follow-up, cough, fatigue and chest congestion. Diagnoses and all orders for this visit:    Chronic cough  -     XR CHEST STANDARD (2 VW); Future  -     FULL PFT STUDY WITH PRE AND POST; Future  -     albuterol sulfate HFA (PROAIR HFA) 108 (90 Base) MCG/ACT inhaler; Inhale 2 puffs into the lungs every 6 hours as needed for Wheezing    Exertional dyspnea  -     XR CHEST STANDARD (2 VW); Future  -     FULL PFT STUDY WITH PRE AND POST; Future  -     albuterol sulfate HFA (PROAIR HFA) 108 (90 Base) MCG/ACT inhaler; Inhale 2 puffs into the lungs every 6 hours as needed for Wheezing    Chronic fatigue  -     Comprehensive Metabolic Panel; Future  -     CBC With Auto Differential; Future  -     TSH With Reflex Ft4; Future    Pure hypercholesterolemia  -     atorvastatin (LIPITOR) 80 MG tablet; Take 1 tablet by mouth daily  -     Comprehensive Metabolic Panel; Future  -     Lipid Panel; Future    Gastroesophageal reflux disease, esophagitis presence not specified  -     omeprazole (PRILOSEC) 20 MG delayed release capsule; Take 1 capsule by mouth Daily    Major depressive disorder with single episode, in full remission ([facility] [address]  -     citalopram (CELEXA) 40 MG tablet;  Take 1 tablet by mouth daily    medications refills as

## 2018-01-05 ENCOUNTER — TELEPHONE (OUTPATIENT)
Dept: FAMILY MEDICINE CLINIC | Age: 53
End: 2018-01-05

## 2018-01-06 ENCOUNTER — HOSPITAL ENCOUNTER (OUTPATIENT)
Age: 53
Discharge: HOME | End: 2018-01-06
Payer: COMMERCIAL

## 2018-01-06 ENCOUNTER — HOSPITAL ENCOUNTER (OUTPATIENT)
Dept: GENERAL RADIOLOGY | Age: 53
Discharge: HOME | End: 2018-01-06
Payer: COMMERCIAL

## 2018-01-06 DIAGNOSIS — E78.00 PURE HYPERCHOLESTEROLEMIA: ICD-10-CM

## 2018-01-06 DIAGNOSIS — R06.09 EXERTIONAL DYSPNEA: ICD-10-CM

## 2018-01-06 DIAGNOSIS — R05.3 CHRONIC COUGH: ICD-10-CM

## 2018-01-06 DIAGNOSIS — R53.82 CHRONIC FATIGUE: ICD-10-CM

## 2018-01-06 LAB
ALBUMIN SERPL-MCNC: 4 G/DL (ref 3.5–5.1)
ALP BLD-CCNC: 99 U/L (ref 38–126)
ALT SERPL-CCNC: 19 U/L (ref 11–66)
ANION GAP SERPL CALCULATED.3IONS-SCNC: 13 MEQ/L (ref 8–16)
AST SERPL-CCNC: 18 U/L (ref 5–40)
BASOPHILS # BLD: 0.3 %
BASOPHILS ABSOLUTE: 0 THOU/MM3 (ref 0–0.1)
BILIRUB SERPL-MCNC: 0.8 MG/DL (ref 0.3–1.2)
BUN BLDV-MCNC: 10 MG/DL (ref 7–22)
CALCIUM SERPL-MCNC: 9.2 MG/DL (ref 8.5–10.5)
CHLORIDE BLD-SCNC: 103 MEQ/L (ref 98–111)
CHOLESTEROL, TOTAL: 122 MG/DL (ref 100–199)
CO2: 27 MEQ/L (ref 23–33)
CREAT SERPL-MCNC: 0.7 MG/DL (ref 0.4–1.2)
EOSINOPHIL # BLD: 3.1 %
EOSINOPHILS ABSOLUTE: 0.2 THOU/MM3 (ref 0–0.4)
GFR SERPL CREATININE-BSD FRML MDRD: 88 ML/MIN/1.73M2
GLUCOSE BLD-MCNC: 105 MG/DL (ref 70–108)
HCT VFR BLD CALC: 44.6 % (ref 37–47)
HDLC SERPL-MCNC: 47 MG/DL
HEMOGLOBIN: 14.9 GM/DL (ref 12–16)
LDL CHOLESTEROL CALCULATED: 53 MG/DL
LYMPHOCYTES # BLD: 31.6 %
LYMPHOCYTES ABSOLUTE: 2.1 THOU/MM3 (ref 1–4.8)
MCH RBC QN AUTO: 31.6 PG (ref 27–31)
MCHC RBC AUTO-ENTMCNC: 33.5 GM/DL (ref 33–37)
MCV RBC AUTO: 94.4 FL (ref 81–99)
MONOCYTES # BLD: 4.9 %
MONOCYTES ABSOLUTE: 0.3 THOU/MM3 (ref 0.4–1.3)
NUCLEATED RED BLOOD CELLS: 0 /100 WBC
PDW BLD-RTO: 14 % (ref 11.5–14.5)
PLATELET # BLD: 273 THOU/MM3 (ref 130–400)
PMV BLD AUTO: 9.8 MCM (ref 7.4–10.4)
POTASSIUM SERPL-SCNC: 4.6 MEQ/L (ref 3.5–5.2)
RBC # BLD: 4.72 MILL/MM3 (ref 4.2–5.4)
SEG NEUTROPHILS: 60.1 %
SEGMENTED NEUTROPHILS ABSOLUTE COUNT: 3.9 THOU/MM3 (ref 1.8–7.7)
SODIUM BLD-SCNC: 143 MEQ/L (ref 135–145)
TOTAL PROTEIN: 6.7 G/DL (ref 6.1–8)
TRIGL SERPL-MCNC: 112 MG/DL (ref 0–199)
TSH SERPL DL<=0.05 MIU/L-ACNC: 1.74 UIU/ML (ref 0.4–4.2)
WBC # BLD: 6.5 THOU/MM3 (ref 4.8–10.8)

## 2018-01-06 PROCEDURE — 80053 COMPREHEN METABOLIC PANEL: CPT

## 2018-01-06 PROCEDURE — 71046 X-RAY EXAM CHEST 2 VIEWS: CPT

## 2018-01-06 PROCEDURE — 36415 COLL VENOUS BLD VENIPUNCTURE: CPT

## 2018-01-06 PROCEDURE — 80061 LIPID PANEL: CPT

## 2018-01-06 PROCEDURE — 84443 ASSAY THYROID STIM HORMONE: CPT

## 2018-01-06 PROCEDURE — 85025 COMPLETE CBC W/AUTO DIFF WBC: CPT

## 2018-01-08 ENCOUNTER — TELEPHONE (OUTPATIENT)
Dept: FAMILY MEDICINE CLINIC | Age: 53
End: 2018-01-08

## 2018-01-08 NOTE — TELEPHONE ENCOUNTER
----- Message from Black Warner CNP sent at 1/8/2018  7:47 AM EST -----  Let Shaw Brar know her blood work looks good, normal thyroid, kidney, liver function, and blood count. Her cholesterol numbers are also within normal range. Total cholesterol 122 down from 230, triglycerides 112 down from 179, LDL 53 down from 156 and HDL 47 up from 38. Looks good! Let me know if any questions!

## 2018-01-09 ENCOUNTER — HOSPITAL ENCOUNTER (OUTPATIENT)
Dept: PULMONOLOGY | Age: 53
Discharge: HOME | End: 2018-01-09
Payer: COMMERCIAL

## 2018-01-09 DIAGNOSIS — R06.09 EXERTIONAL DYSPNEA: ICD-10-CM

## 2018-01-09 DIAGNOSIS — R05.3 CHRONIC COUGH: ICD-10-CM

## 2018-01-09 PROCEDURE — 94726 PLETHYSMOGRAPHY LUNG VOLUMES: CPT

## 2018-01-09 PROCEDURE — 94729 DIFFUSING CAPACITY: CPT

## 2018-01-09 PROCEDURE — 94060 EVALUATION OF WHEEZING: CPT

## 2018-01-11 ENCOUNTER — TELEPHONE (OUTPATIENT)
Dept: FAMILY MEDICINE CLINIC | Age: 53
End: 2018-01-11

## 2018-01-12 ENCOUNTER — TELEPHONE (OUTPATIENT)
Dept: FAMILY MEDICINE CLINIC | Age: 53
End: 2018-01-12

## 2018-01-12 NOTE — TELEPHONE ENCOUNTER
Pharmacy is needing a clarification on Omeprazole they have 10 mg and 20 mg on file and not sure which one to refill. Please call [phone].

## 2018-02-15 ENCOUNTER — OFFICE VISIT (OUTPATIENT)
Dept: FAMILY MEDICINE CLINIC | Age: 53
End: 2018-02-15
Payer: COMMERCIAL

## 2018-02-15 VITALS
BODY MASS INDEX: 41.96 KG/M2 | HEART RATE: 80 BPM | WEIGHT: 228 LBS | HEIGHT: 62 IN | RESPIRATION RATE: 12 BRPM | TEMPERATURE: 98.1 F | DIASTOLIC BLOOD PRESSURE: 88 MMHG | SYSTOLIC BLOOD PRESSURE: 136 MMHG

## 2018-02-15 DIAGNOSIS — Z72.0 TOBACCO ABUSE: Primary | ICD-10-CM

## 2018-02-15 DIAGNOSIS — J44.9 COPD, MILD (HCC): ICD-10-CM

## 2018-02-15 PROCEDURE — 99214 OFFICE O/P EST MOD 30 MIN: CPT | Performed by: NURSE PRACTITIONER

## 2018-02-15 NOTE — PROGRESS NOTES
n/a    Current Outpatient Prescriptions   Medication Sig Dispense Refill    aclidinium (TUDORZA PRESSAIR) 400 MCG/ACT AEPB inhaler Inhale 1 puff into the lungs 2 times daily 1 each 11    atorvastatin (LIPITOR) 80 MG tablet Take 1 tablet by mouth daily 90 tablet 3    citalopram (CELEXA) 40 MG tablet Take 1 tablet by mouth daily 90 tablet 3    omeprazole (PRILOSEC) 20 MG delayed release capsule Take 1 capsule by mouth Daily 90 capsule 3    albuterol sulfate HFA (PROAIR HFA) 108 (90 Base) MCG/ACT inhaler Inhale 2 puffs into the lungs every 6 hours as needed for Wheezing 1 Inhaler 3    fluticasone (FLONASE) 50 MCG/ACT nasal spray 2 sprays by Nasal route daily 1 Bottle 1    tiZANidine (ZANAFLEX) 4 MG tablet Take 1 tablet by mouth every 8 hours as needed (right shoulder pain) 30 tablet 1    ibuprofen (ADVIL;MOTRIN) 800 MG tablet Take 1 tablet by mouth every 6 hours as needed for Pain 180 tablet 3     No current facility-administered medications for this visit. Orders Placed This Encounter   Medications    aclidinium (TUDORZA PRESSAIR) 400 MCG/ACT AEPB inhaler     Sig: Inhale 1 puff into the lungs 2 times daily     Dispense:  1 each     Refill:  11         All medications reviewed and reconciled, including OTC and herbal medications. Updated list given to patient. Patient Active Problem List   Diagnosis    Depression    Hyperlipidemia    COPD, mild ([address]       Past Medical History:   Diagnosis Date    COPD, mild ([facility] [address] 2/15/2018    Depression     Hyperlipidemia     Hypertension        Past Surgical History:   Procedure Laterality Date    ENDOMETRIAL ABLATION      OVARY REMOVAL      left    TONSILLECTOMY         Allergies   Allergen Reactions    Flagyl [Metronidazole] Nausea And Vomiting       Social History     Social History    Marital status:      Spouse name: N/A    Number of children: N/A    Years of education: N/A     Occupational History    Not on file.      Social History Main 01/06/2018    LABGLOM 88 (A) 01/06/2018     Lab Results   Component Value Date    CHOL 122 01/06/2018    CHOL 230 (H) 09/02/2017    CHOL 215 (H) 05/20/2017     Lab Results   Component Value Date    TRIG 112 01/06/2018    TRIG 179 09/02/2017    TRIG 155 05/20/2017     Lab Results   Component Value Date    HDL 47 01/06/2018    HDL 38 09/02/2017    HDL 38 05/20/2017     Lab Results   Component Value Date    LDLCALC 53 01/06/2018    LDLCALC 156 09/02/2017    LDLCALC 146 05/20/2017     Lab Results   Component Value Date    LABA1C 5.3 07/01/2017         The heart size is normal.  The mediastinum is not widened.  There are no pulmonary infiltrates or effusions.  There are some prominent lung markings in the lung bases. This appears stable. This is likely related to overlapping soft tissue. The pulmonary    vascularity is normal. No suspicious osseous lesions are present. PHYSICAL EXAM:  Vitals:    02/15/18 1742   BP: 136/88   Pulse: 80   Resp: 12   Temp: 98.1 °F (36.7 °C)   TempSrc: Oral   Weight: 228 lb (103.4 kg)   Height: 5' 2.01\" (1.575 m)     Body mass index is 41.69 kg/m². VS Reviewed  General Appearance: A&O x 3, No acute distress,well developed and well- nourished  Head: normocephalic and atraumatic  Eyes: pupils equal, round, and reactive to light, extraocular eye movements intact, conjunctivae and eye lids without erythema  Neck: supple and non-tender without mass, no thyromegaly or thyroid nodules, no cervical lymphadenopathy  Pulmonary/Chest: soft inspiratory and expiratory wheeze, no rales or ronchi  Cardiovascular: S1 and S2 auscultated w/ RRR. No murmurs, rubs, clicks, or gallops, distal pulses intact. Abdomen: soft, non-tender, non-distended, bowl sounds physiologic,  no rebound or guarding, no masses or hernias noted. Liver and spleen without enlargement.    Extremities: no cyanosis, clubbing or edema of the lower extremities  Musculoskeletal: No joint swelling or gross deformity   Neuro:  Alert, 5/5 strength globally and symmetrically  Psych: Affect appropriate. Mood normal. Thought process is normal without evidence of depression or psychosis. Good insight and appropriate interaction. Cognition and memory appear to be intact. Skin: warm and dry  Lymph:  No cervical, auricular or supraclavicular lymph nodes palpated      ASSESSMENT & PLAN  Yunier Wisdom was seen today for follow-up. Diagnoses and all orders for this visit:    Tobacco abuse    COPD, mild (HCC)  -     aclidinium (TUDORZA PRESSAIR) 400 MCG/ACT AEPB inhaler; Inhale 1 puff into the lungs 2 times daily       Discussed different options of treatment for tobacco abuse, including chantix, wellbutrin, discussed benefits and risks of them Patient declines at this point, isn't quite motivated or ready for this yet. She is working on weaning herself down, and will f/u with this when she is ready  Continue albuterol, patient gets benefit with her Mild COPD with the Petra Avalos, so will start her on maintenance Rx of this. I spent approximately 5 minutes with patient today on smoking cessation counseling. I discussed the benefits of smoking cessation as well as the harms of continued smoking. DISPOSITION    Return in about 1 year (around 2/15/2019) for chronic conditions. Yunier Wisdom released without restrictions. PATIENT COUNSELING    Counseling was provided today regarding the following topics: Healthy eating habits, Regular exercise, substance abuse and healthy sleep habits. Yunier Wisdom received counseling on the following healthy behaviors: medication adherence    Patient given educational materials on: See Attached    I have instructed Yunier Wisdom to complete a self tracking handout on none and instructed them to bring it with them to her next appointment. Barriers to learning and self management: none    Discussed use, benefit, and side effects of prescribed medications. Barriers to medication compliance addressed.   All patient

## 2018-03-08 ENCOUNTER — OFFICE VISIT (OUTPATIENT)
Dept: FAMILY MEDICINE CLINIC | Age: 53
End: 2018-03-08
Payer: COMMERCIAL

## 2018-03-08 VITALS
WEIGHT: 231 LBS | HEART RATE: 73 BPM | HEIGHT: 62 IN | RESPIRATION RATE: 16 BRPM | BODY MASS INDEX: 42.51 KG/M2 | DIASTOLIC BLOOD PRESSURE: 76 MMHG | SYSTOLIC BLOOD PRESSURE: 134 MMHG | TEMPERATURE: 99.5 F

## 2018-03-08 DIAGNOSIS — J10.1 INFLUENZA A: Primary | ICD-10-CM

## 2018-03-08 DIAGNOSIS — J44.9 COPD, MILD (HCC): ICD-10-CM

## 2018-03-08 LAB
INFLUENZA A ANTIBODY: POSITIVE
INFLUENZA B ANTIBODY: NEGATIVE

## 2018-03-08 PROCEDURE — 99213 OFFICE O/P EST LOW 20 MIN: CPT | Performed by: NURSE PRACTITIONER

## 2018-03-08 PROCEDURE — 87804 INFLUENZA ASSAY W/OPTIC: CPT | Performed by: NURSE PRACTITIONER

## 2018-03-08 RX ORDER — OSELTAMIVIR PHOSPHATE 75 MG/1
75 CAPSULE ORAL 2 TIMES DAILY
Qty: 10 CAPSULE | Refills: 0 | Status: SHIPPED | OUTPATIENT
Start: 2018-03-08 | End: 2018-03-13

## 2018-03-08 RX ORDER — GUAIFENESIN AND CODEINE PHOSPHATE 100; 10 MG/5ML; MG/5ML
SOLUTION ORAL
Qty: 280 ML | Refills: 0 | Status: SHIPPED | OUTPATIENT
Start: 2018-03-08 | End: 2018-03-15

## 2018-03-08 NOTE — LETTER
[address]  [address] [address]  Phone: [phone]  Fax: [phone] [facility]        March 8, 2018     Patient: Walter Garcia   YOB: 1965   Date of Visit: 3/8/2018       To Whom it May Concern:    Tanja Tejeda was seen in my clinic on 3/8/2018. She may return to work on 3/12/18. If you have any questions or concerns, please don't hesitate to call.     Sincerely,         Mayuri Acevedo, CNP

## 2018-03-08 NOTE — PATIENT INSTRUCTIONS
your doctor if:  ? · You begin to get better and then get worse. ? · You are not getting better after 1 week. Where can you learn more? Go to [email or web address] and sign in to your [facility] account. Enter Y066 in the [facility] box to learn more about \"Influenza (Flu): Care Instructions. \"     If you do not have an account, please click on the \"Sign Up Now\" link. Current as of: May 12, 2017  Content Version: 11.5  © 3413-8005 [employer]. Care instructions adapted under license by [facility] ([facility]). If you have questions about a medical condition or this instruction, always ask your healthcare professional. [address] any warranty or liability for your use of this information.

## 2019-02-06 DIAGNOSIS — B00.1 RECURRENT COLD SORES: ICD-10-CM

## 2019-02-06 RX ORDER — ACYCLOVIR 400 MG/1
400 TABLET ORAL 3 TIMES DAILY
Qty: 15 TABLET | Refills: 3 | Status: SHIPPED | OUTPATIENT
Start: 2019-02-06 | End: 2019-02-11

## 2019-03-07 ENCOUNTER — OFFICE VISIT (OUTPATIENT)
Dept: FAMILY MEDICINE CLINIC | Age: 54
End: 2019-03-07
Payer: COMMERCIAL

## 2019-03-07 VITALS
HEART RATE: 86 BPM | RESPIRATION RATE: 14 BRPM | SYSTOLIC BLOOD PRESSURE: 138 MMHG | TEMPERATURE: 97.9 F | WEIGHT: 240.4 LBS | BODY MASS INDEX: 44.24 KG/M2 | DIASTOLIC BLOOD PRESSURE: 76 MMHG | HEIGHT: 62 IN

## 2019-03-07 DIAGNOSIS — E78.00 PURE HYPERCHOLESTEROLEMIA: ICD-10-CM

## 2019-03-07 DIAGNOSIS — K21.9 GASTROESOPHAGEAL REFLUX DISEASE, ESOPHAGITIS PRESENCE NOT SPECIFIED: ICD-10-CM

## 2019-03-07 DIAGNOSIS — F32.5 MAJOR DEPRESSIVE DISORDER WITH SINGLE EPISODE, IN FULL REMISSION (HCC): ICD-10-CM

## 2019-03-07 DIAGNOSIS — E66.01 CLASS 3 SEVERE OBESITY DUE TO EXCESS CALORIES WITH SERIOUS COMORBIDITY AND BODY MASS INDEX (BMI) OF 40.0 TO 44.9 IN ADULT (HCC): Primary | ICD-10-CM

## 2019-03-07 DIAGNOSIS — J44.9 COPD, MILD (HCC): ICD-10-CM

## 2019-03-07 PROCEDURE — 99214 OFFICE O/P EST MOD 30 MIN: CPT | Performed by: NURSE PRACTITIONER

## 2019-03-07 RX ORDER — PHENTERMINE HYDROCHLORIDE 37.5 MG/1
37.5 TABLET ORAL
Qty: 30 TABLET | Refills: 0 | Status: SHIPPED | OUTPATIENT
Start: 2019-03-07 | End: 2019-04-06

## 2019-03-07 RX ORDER — OMEPRAZOLE 20 MG/1
20 CAPSULE, DELAYED RELEASE ORAL DAILY
Qty: 90 CAPSULE | Refills: 3 | Status: SHIPPED | OUTPATIENT
Start: 2019-03-07 | End: 2020-03-14 | Stop reason: SDUPTHER

## 2019-03-07 RX ORDER — ATORVASTATIN CALCIUM 80 MG/1
80 TABLET, FILM COATED ORAL DAILY
Qty: 90 TABLET | Refills: 3 | Status: SHIPPED | OUTPATIENT
Start: 2019-03-07 | End: 2020-03-14 | Stop reason: SDUPTHER

## 2019-03-07 RX ORDER — OMEPRAZOLE 20 MG/1
20 CAPSULE, DELAYED RELEASE ORAL DAILY
Qty: 30 CAPSULE | Refills: 0 | Status: SHIPPED | OUTPATIENT
Start: 2019-03-07 | End: 2019-10-10 | Stop reason: SDUPTHER

## 2019-03-07 RX ORDER — CITALOPRAM 40 MG/1
40 TABLET ORAL DAILY
Qty: 90 TABLET | Refills: 3 | Status: SHIPPED | OUTPATIENT
Start: 2019-03-07 | End: 2020-03-14 | Stop reason: SDUPTHER

## 2019-05-17 ENCOUNTER — HOSPITAL ENCOUNTER (OUTPATIENT)
Age: 54
Discharge: HOME | End: 2019-05-17
Payer: COMMERCIAL

## 2019-05-25 ENCOUNTER — HOSPITAL ENCOUNTER (OUTPATIENT)
Age: 54
Discharge: HOME | End: 2019-05-25
Payer: COMMERCIAL

## 2019-05-25 DIAGNOSIS — E66.01 CLASS 3 SEVERE OBESITY DUE TO EXCESS CALORIES WITH SERIOUS COMORBIDITY AND BODY MASS INDEX (BMI) OF 40.0 TO 44.9 IN ADULT (HCC): ICD-10-CM

## 2019-05-25 DIAGNOSIS — E78.00 PURE HYPERCHOLESTEROLEMIA: ICD-10-CM

## 2019-05-25 LAB
ALBUMIN SERPL-MCNC: 4.3 G/DL (ref 3.5–5.1)
ALP BLD-CCNC: 98 U/L (ref 38–126)
ALT SERPL-CCNC: 19 U/L (ref 11–66)
ANION GAP SERPL CALCULATED.3IONS-SCNC: 12 MEQ/L (ref 8–16)
AST SERPL-CCNC: 19 U/L (ref 5–40)
AVERAGE GLUCOSE: 105 MG/DL (ref 70–126)
BASOPHILS # BLD: 0.4 %
BASOPHILS ABSOLUTE: 0 THOU/MM3 (ref 0–0.1)
BILIRUB SERPL-MCNC: 1 MG/DL (ref 0.3–1.2)
BUN BLDV-MCNC: 13 MG/DL (ref 7–22)
CALCIUM SERPL-MCNC: 9.8 MG/DL (ref 8.5–10.5)
CHLORIDE BLD-SCNC: 104 MEQ/L (ref 98–111)
CHOLESTEROL, FASTING: 122 MG/DL (ref 100–199)
CO2: 27 MEQ/L (ref 23–33)
CREAT SERPL-MCNC: 0.7 MG/DL (ref 0.4–1.2)
EOSINOPHIL # BLD: 3.6 %
EOSINOPHILS ABSOLUTE: 0.2 THOU/MM3 (ref 0–0.4)
ERYTHROCYTE [DISTWIDTH] IN BLOOD BY AUTOMATED COUNT: 12.9 % (ref 11.5–14.5)
ERYTHROCYTE [DISTWIDTH] IN BLOOD BY AUTOMATED COUNT: 46.1 FL (ref 35–45)
GFR SERPL CREATININE-BSD FRML MDRD: 87 ML/MIN/1.73M2
GLUCOSE BLD-MCNC: 106 MG/DL (ref 70–108)
HBA1C MFR BLD: 5.5 % (ref 4.4–6.4)
HCT VFR BLD CALC: 45.4 % (ref 37–47)
HDLC SERPL-MCNC: 42 MG/DL
HEMOGLOBIN: 15 GM/DL (ref 12–16)
IMMATURE GRANS (ABS): 0.01 THOU/MM3 (ref 0–0.07)
IMMATURE GRANULOCYTES: 0.1 %
LDL CHOLESTEROL CALCULATED: 47 MG/DL
LYMPHOCYTES # BLD: 29.7 %
LYMPHOCYTES ABSOLUTE: 2 THOU/MM3 (ref 1–4.8)
MCH RBC QN AUTO: 32.1 PG (ref 26–33)
MCHC RBC AUTO-ENTMCNC: 33 GM/DL (ref 32.2–35.5)
MCV RBC AUTO: 97.2 FL (ref 81–99)
MONOCYTES # BLD: 6.1 %
MONOCYTES ABSOLUTE: 0.4 THOU/MM3 (ref 0.4–1.3)
NUCLEATED RED BLOOD CELLS: 0 /100 WBC
PLATELET # BLD: 250 THOU/MM3 (ref 130–400)
PMV BLD AUTO: 11.5 FL (ref 9.4–12.4)
POTASSIUM SERPL-SCNC: 4.5 MEQ/L (ref 3.5–5.2)
RBC # BLD: 4.67 MILL/MM3 (ref 4.2–5.4)
SEG NEUTROPHILS: 60.1 %
SEGMENTED NEUTROPHILS ABSOLUTE COUNT: 4 THOU/MM3 (ref 1.8–7.7)
SODIUM BLD-SCNC: 143 MEQ/L (ref 135–145)
TOTAL PROTEIN: 7 G/DL (ref 6.1–8)
TRIGLYCERIDE, FASTING: 164 MG/DL (ref 0–199)
TSH SERPL DL<=0.05 MIU/L-ACNC: 2.05 UIU/ML (ref 0.4–4.2)
WBC # BLD: 6.7 THOU/MM3 (ref 4.8–10.8)

## 2019-05-25 PROCEDURE — 36415 COLL VENOUS BLD VENIPUNCTURE: CPT

## 2019-05-25 PROCEDURE — 85025 COMPLETE CBC W/AUTO DIFF WBC: CPT

## 2019-05-25 PROCEDURE — 80061 LIPID PANEL: CPT

## 2019-05-25 PROCEDURE — 83036 HEMOGLOBIN GLYCOSYLATED A1C: CPT

## 2019-05-25 PROCEDURE — 80053 COMPREHEN METABOLIC PANEL: CPT

## 2019-05-25 PROCEDURE — 84443 ASSAY THYROID STIM HORMONE: CPT

## 2019-05-29 ENCOUNTER — TELEPHONE (OUTPATIENT)
Dept: FAMILY MEDICINE CLINIC | Age: 54
End: 2019-05-29

## 2019-05-29 NOTE — TELEPHONE ENCOUNTER
----- Message from Juleen Romberg, APRN - CNP sent at 5/29/2019  8:54 AM EDT -----  Let Merry Husain know all her labs are stable and appropriate. Everything looks good!

## 2019-06-06 ENCOUNTER — OFFICE VISIT (OUTPATIENT)
Dept: FAMILY MEDICINE CLINIC | Age: 54
End: 2019-06-06
Payer: COMMERCIAL

## 2019-06-06 VITALS
HEIGHT: 63 IN | SYSTOLIC BLOOD PRESSURE: 154 MMHG | WEIGHT: 238 LBS | BODY MASS INDEX: 42.17 KG/M2 | HEART RATE: 88 BPM | DIASTOLIC BLOOD PRESSURE: 96 MMHG | RESPIRATION RATE: 14 BRPM | TEMPERATURE: 98.4 F

## 2019-06-06 DIAGNOSIS — R03.0 ELEVATED BP WITHOUT DIAGNOSIS OF HYPERTENSION: ICD-10-CM

## 2019-06-06 DIAGNOSIS — E66.01 CLASS 3 SEVERE OBESITY DUE TO EXCESS CALORIES WITH SERIOUS COMORBIDITY AND BODY MASS INDEX (BMI) OF 40.0 TO 44.9 IN ADULT (HCC): Primary | ICD-10-CM

## 2019-06-06 DIAGNOSIS — Z12.39 SCREENING FOR BREAST CANCER: ICD-10-CM

## 2019-06-06 DIAGNOSIS — F32.4 MAJOR DEPRESSIVE DISORDER WITH SINGLE EPISODE, IN PARTIAL REMISSION (HCC): ICD-10-CM

## 2019-06-06 DIAGNOSIS — S46.811A TRAPEZIUS MUSCLE STRAIN, RIGHT, INITIAL ENCOUNTER: ICD-10-CM

## 2019-06-06 PROBLEM — E66.813 CLASS 3 SEVERE OBESITY DUE TO EXCESS CALORIES WITH SERIOUS COMORBIDITY AND BODY MASS INDEX (BMI) OF 40.0 TO 44.9 IN ADULT: Status: ACTIVE | Noted: 2019-06-06

## 2019-06-06 PROCEDURE — 99214 OFFICE O/P EST MOD 30 MIN: CPT | Performed by: NURSE PRACTITIONER

## 2019-06-06 RX ORDER — BUPROPION HYDROCHLORIDE 150 MG/1
150 TABLET ORAL EVERY MORNING
Qty: 30 TABLET | Refills: 3 | Status: SHIPPED | OUTPATIENT
Start: 2019-06-06 | End: 2019-06-27 | Stop reason: SDUPTHER

## 2019-06-06 RX ORDER — TIZANIDINE 4 MG/1
4 TABLET ORAL EVERY 8 HOURS PRN
Qty: 60 TABLET | Refills: 2 | Status: SHIPPED | OUTPATIENT
Start: 2019-06-06 | End: 2020-03-14 | Stop reason: SDUPTHER

## 2019-06-06 NOTE — PROGRESS NOTES
Chief Complaint   Patient presents with    Weight Loss    Shoulder Pain     6mo back/shoulder muscle       History obtained from chart review and the patient. SUBJECTIVE:  Alondra Valencia is a 47 y.o. female that presents today for follow up for chronic conditions     HPI:    The patient presents to the office for a refill on phentermine (Adipex). She completed 1 month of the Adipex but forgot about the nurse weight checks. She did not complete the second and third months. The patient has been prescribed phentermine as part of a weight loss regimen which also includes dietary restrictions and structured exercise program.  The patient reports compliance with the dietary restrictions is good. The patient reports an exercise regimen which includes walking    Patient reports no adverse side effects from current medication regimen. ROS:  Gen: no fevers, chills, sweats. Cardiac: No chest pain, palpitations, syncope, lightheadedness. Pulmonary: No wheezes or dyspnea. GI: No nausea, vomiting, diarrhea, abdominal discomfort. Neuro: No tremors, headaches. Psych: No agitation or insomnia. Diet has been good. She is very good about eating healthy diet. Wt Readings from Last 3 Encounters:   06/06/19 238 lb (108 kg)   03/07/19 240 lb 6.4 oz (109 kg)   03/08/18 231 lb (104.8 kg)       Shoulder Pain    HPI:    Symptoms have been present for 5-6 month(s) in the right shoulder. The pain is constant. The patient describes the pain as aching. Inciting injury or history of trauma? No  Pain is relieved by - Zanaflex  Pain is aggravated by - sleeping on her right side  Radiation of the pain? Yes - down the right arm  Paresthesias of the extremities? No  Decreased ROM?  No  Treatments tried - Zanaflex     Age/Gender Health Maintenance    Lipid   Lab Results   Component Value Date    CHOL 122 01/06/2018    CHOL 230 (H) 09/02/2017    CHOL 215 (H) 05/20/2017     Lab Results   Component Value Date    TRIG 112 01/06/2018    TRIG 179 09/02/2017    TRIG 155 05/20/2017     Lab Results   Component Value Date    HDL 42 05/25/2019    HDL 47 01/06/2018    HDL 38 09/02/2017     Lab Results   Component Value Date    LDLCALC 47 05/25/2019    LDLCALC 53 01/06/2018    [address] 156 09/02/2017     DM Screen  Lab Results   Component Value Date    LABA1C 5.5 05/25/2019     Colon Cancer Screening - had 6-7 years Dr. Davidson (Age 54 to [de-identified] with 27 pack year hx, current smoker or quit within past 15 years) - n/a    Tetanus - needs  Influenza Vaccine - needs  Pneumonia Vaccine - 65  Zostavax - 50  HPV Vaccine - n/a    Breast Cancer Screening - mammogram 6/6/19  Cervical Cancer Screening - UTD  Osteoporosis Screening - 72    AAA Screening - n/a    Falls screening - n/a    Current Outpatient Medications   Medication Sig Dispense Refill    tiZANidine (ZANAFLEX) 4 MG tablet Take 1 tablet by mouth every 8 hours as needed (right shoulder pain) 60 tablet 2    buPROPion (WELLBUTRIN XL) 150 MG extended release tablet Take 1 tablet by mouth every morning 30 tablet 3    aclidinium (TUDORZA PRESSAIR) 400 MCG/ACT AEPB inhaler Inhale 1 puff into the lungs 2 times daily 3 each 3    atorvastatin (LIPITOR) 80 MG tablet Take 1 tablet by mouth daily 90 tablet 3    omeprazole (PRILOSEC) 20 MG delayed release capsule Take 1 capsule by mouth Daily 90 capsule 3    citalopram (CELEXA) 40 MG tablet Take 1 tablet by mouth daily 90 tablet 3    omeprazole (PRILOSEC) 20 MG delayed release capsule Take 1 capsule by mouth Daily 30 capsule 0    albuterol sulfate HFA (PROAIR HFA) 108 (90 Base) MCG/ACT inhaler Inhale 2 puffs into the lungs every 6 hours as needed for Wheezing 1 Inhaler 3    fluticasone (FLONASE) 50 MCG/ACT nasal spray 2 sprays by Nasal route daily 1 Bottle 1    ibuprofen (ADVIL;MOTRIN) 800 MG tablet Take 1 tablet by mouth every 6 hours as needed for Pain 180 tablet 3     No current facility-administered medications for this visit. Orders Placed This Encounter   Medications    tiZANidine (ZANAFLEX) 4 MG tablet     Sig: Take 1 tablet by mouth every 8 hours as needed (right shoulder pain)     Dispense:  60 tablet     Refill:  2    buPROPion (WELLBUTRIN XL) 150 MG extended release tablet     Sig: Take 1 tablet by mouth every morning     Dispense:  30 tablet     Refill:  3         All medications reviewed and reconciled, including OTC and herbal medications. Updated list given to patient. Patient Active Problem List   Diagnosis    Depression    Hyperlipidemia    COPD, mild ([facility])    Class 3 severe obesity due to excess calories with serious comorbidity and body mass index (BMI) of 40.0 to 44.9 in adult [facility])       Past Medical History:   Diagnosis Date    COPD, mild ([facility] [address] 2/15/2018    Depression     Hyperlipidemia     Hypertension        Past Surgical History:   Procedure Laterality Date    ENDOMETRIAL ABLATION      OVARY REMOVAL      left    TONSILLECTOMY         Allergies   Allergen Reactions    Flagyl [Metronidazole] Nausea And Vomiting       Social History     Socioeconomic History    Marital status:      Spouse name: Not on file    Number of children: Not on file    Years of education: Not on file    Highest education level: Not on file   Occupational History    Not on file   Social Needs    Financial resource strain: Not on file    Food insecurity:     Worry: Not on file     Inability: Not on file    Transportation needs:     Medical: Not on file     Non-medical: Not on file   Tobacco Use    Smoking status: Current Every Day Smoker     Packs/day: 0.50    Smokeless tobacco: Never Used   Substance and Sexual Activity    Alcohol use:  Yes    Drug use: No    Sexual activity: Not on file   Lifestyle    Physical activity:     Days per week: Not on file     Minutes per session: Not on file    Stress: Not on file   Relationships    Social connections:     Talks on phone: Not on file     Gets together: Not on file     Attends Confucianism service: Not on file     Active member of club or organization: Not on file     Attends meetings of clubs or organizations: Not on file     Relationship status: Not on file    Intimate partner violence:     Fear of current or ex partner: Not on file     Emotionally abused: Not on file     Physically abused: Not on file     Forced sexual activity: Not on file   Other Topics Concern    Not on file   Social History Narrative    Not on file       No family history on file. I have reviewed the patient's past medical history, past surgical history, allergies, medications, social and family history and I have made updates where appropriate.       Review of Systems  Positive responses are highlighted in bold    Constitutional:  Fever, Chills, Night Sweats, Fatigue, Unexpected changes in weight  Eyes:  Eye discharge, Eye pain, Eye redness, Visual disturbances   HENT:  Ear pain, Tinnitus, Nosebleeds, Trouble swallowing, Hearing loss, Sore throat  Cardiovascular:  Chest Pain, Palpitations, Orthopnea, Paroxysmal Nocturnal Dyspnea  Respiratory:  Cough, Wheezing, Shortness of breath, Chest tightness, Apnea  Gastrointestinal:  Nausea, Vomiting, Diarrhea, Constipation, Heartburn, Blood in stool  Genitourinary:  Difficulty or painful urination, Flank pain, Change in frequency, Urgency  Skin:  Color change, Rash, Itching, Wound  Psychiatric:  Hallucinations, Anxiety, Depression, Suicidal ideation  Hematological:  Enlarged glands, Easy bleeding, Easily bruising  Musculoskeletal:  Joint pain, Back pain, Gait problems, Joint swelling, Myalgias  Neurological:  Dizziness, Headaches, Presyncope, Numbness, Seizures, Tremors  Allergy:  Environmental allergies, Food allergies  Endocrine:  Heat Intolerance, Cold Intolerance, Polydipsia, Polyphagia, Polyuria    Lab Results   Component Value Date     05/25/2019    K 4.5 05/25/2019     05/25/2019    CO2 27 05/25/2019    BUN 13 05/25/2019    CREATININE 0.7 05/25/2019    GLUCOSE 106 05/25/2019    CALCIUM 9.8 05/25/2019    PROT 7.0 05/25/2019    LABALBU 4.3 05/25/2019    BILITOT 1.0 05/25/2019    ALKPHOS 98 05/25/2019    AST 19 05/25/2019    ALT 19 05/25/2019    LABGLOM 87 (A) 05/25/2019       PHYSICAL EXAM:  Vitals:    06/06/19 1738 06/06/19 1809   BP: (!) 156/98 (!) 154/96   Pulse: 88    Resp: 14    Temp: 98.4 °F (36.9 °C)    TempSrc: Oral    Weight: 238 lb (108 kg)    Height: 5' 3\" (1.6 m)      Body mass index is 42.16 kg/m². Pain Score:   4(right shoulder/back)      VS Reviewed  General Appearance: A&O x 3, No acute distress,well developed and well- nourished  Head: normocephalic and atraumatic  Eyes: pupils equal, round, and reactive to light, extraocular eye movements intact, conjunctivae and eye lids without erythema  Neck: supple and non-tender without mass, no thyromegaly or thyroid nodules, no cervical lymphadenopathy  Pulmonary/Chest: clear to diminished t/o, no w/r/r  Cardiovascular: S1 and S2 auscultated w/ RRR. No murmurs, rubs, clicks, or gallops, distal pulses intact. Abdomen: soft, non-tender, non-distended, bowl sounds physiologic,  no rebound or guarding, no masses or hernias noted. Liver and spleen without enlargement. Extremities: no cyanosis, clubbing or edema of the lower extremities  Musculoskeletal: No joint swelling or gross deformity   Right Shoulder: + pain and muscle spasm right trapezius muscle and down posterior aspect of right shoulder, full ROM of right shoulder, strength normal bilaterally, negative apley scratch and empty can  Neuro:  Alert, 5/5 strength globally and symmetrically  Psych: Affect appropriate. Mood normal. Thought process is normal without evidence of depression or psychosis. Good insight and appropriate interaction. Cognition and memory appear to be intact.   Skin: warm and dry  Lymph:  No cervical, auricular or supraclavicular lymph nodes palpated      ASSESSMENT & PLAN  Naye Ornelas was seen today for weight loss and shoulder pain. Diagnoses and all orders for this visit:    Class 3 severe obesity due to excess calories with serious comorbidity and body mass index (BMI) of 40.0 to 44.9 in adult ([facility])  -     buPROPion (WELLBUTRIN XL) 150 MG extended release tablet; Take 1 tablet by mouth every morning    Trapezius muscle strain, right, initial encounter  -     tiZANidine (ZANAFLEX) 4 MG tablet; Take 1 tablet by mouth every 8 hours as needed (right shoulder pain)    Elevated BP without diagnosis of hypertension    Screening for breast cancer  -     DOREEN DIGITAL SCREEN W CAD BILATERAL; Future    Major depressive disorder with single episode, in partial remission ([facility])  -     buPROPion (WELLBUTRIN XL) 150 MG extended release tablet; Take 1 tablet by mouth every morning        - missed last 2 months of weight checks for adipex, so she has to wait 6 months before she can do any other controlled substance for weight loss  - will add Wellbutrin to see if this helps with weight loss as well as mood symptoms  - recheck BP in 3 weeks  - advised doing PT for her right shoulder, but she declines. She doesn't know when she'll have the time to do PT  - con't the Zanaflex and advised heating pad    DISPOSITION    Return in about 1 month (around 7/4/2019) for bp check, weight loss. Armani Russell released without restrictions. PATIENT COUNSELING    Counseling was provided today regarding the following topics: Healthy eating habits, Regular exercise, substance abuse and healthy sleep habits. Armani Russell received counseling on the following healthy behaviors: medication adherence    Patient given educational materials on: See Attached    I have instructed Armani Russell to complete a self tracking handout on none and instructed them to bring it with them to her next appointment. Barriers to learning and self management: none    Discussed use, benefit, and side effects of prescribed medications.   Barriers to medication compliance addressed. All patient questions answered. Pt voiced understanding.        Electronically signed by MICK Vasquez CNP on 6/6/2019 at 6:18 PM

## 2019-06-17 ENCOUNTER — TELEPHONE (OUTPATIENT)
Dept: FAMILY MEDICINE CLINIC | Age: 54
End: 2019-06-17

## 2019-06-17 NOTE — TELEPHONE ENCOUNTER
----- Message from MICK Kitchen CNP sent at 6/17/2019  1:12 PM EDT -----  Let Karissa Angeles know her mammogram was normal. Recommend f/u with another mammogram in 1 year.

## 2019-06-27 ENCOUNTER — OFFICE VISIT (OUTPATIENT)
Dept: FAMILY MEDICINE CLINIC | Age: 54
End: 2019-06-27
Payer: COMMERCIAL

## 2019-06-27 VITALS
DIASTOLIC BLOOD PRESSURE: 76 MMHG | HEIGHT: 63 IN | BODY MASS INDEX: 41.64 KG/M2 | WEIGHT: 235 LBS | TEMPERATURE: 97.2 F | RESPIRATION RATE: 16 BRPM | SYSTOLIC BLOOD PRESSURE: 124 MMHG | HEART RATE: 70 BPM

## 2019-06-27 DIAGNOSIS — F32.4 MAJOR DEPRESSIVE DISORDER WITH SINGLE EPISODE, IN PARTIAL REMISSION (HCC): ICD-10-CM

## 2019-06-27 DIAGNOSIS — E66.01 CLASS 3 SEVERE OBESITY DUE TO EXCESS CALORIES WITH SERIOUS COMORBIDITY AND BODY MASS INDEX (BMI) OF 40.0 TO 44.9 IN ADULT (HCC): Primary | ICD-10-CM

## 2019-06-27 PROCEDURE — 99213 OFFICE O/P EST LOW 20 MIN: CPT | Performed by: NURSE PRACTITIONER

## 2019-06-27 RX ORDER — BUPROPION HYDROCHLORIDE 150 MG/1
150 TABLET ORAL EVERY MORNING
Qty: 90 TABLET | Refills: 0 | Status: SHIPPED | OUTPATIENT
Start: 2019-06-27 | End: 2019-09-26 | Stop reason: SDUPTHER

## 2019-06-27 NOTE — PROGRESS NOTES
([facility] [address] 2/15/2018    Depression     Hyperlipidemia     Hypertension        Past Surgical History:   Procedure Laterality Date    ENDOMETRIAL ABLATION      OVARY REMOVAL      left    TONSILLECTOMY         Allergies   Allergen Reactions    Flagyl [Metronidazole] Nausea And Vomiting       Social History     Socioeconomic History    Marital status:      Spouse name: Not on file    Number of children: Not on file    Years of education: Not on file    Highest education level: Not on file   Occupational History    Not on file   Social Needs    Financial resource strain: Not on file    Food insecurity:     Worry: Not on file     Inability: Not on file    Transportation needs:     Medical: Not on file     Non-medical: Not on file   Tobacco Use    Smoking status: Current Every Day Smoker     Packs/day: 0.50    Smokeless tobacco: Never Used   Substance and Sexual Activity    Alcohol use: Yes    Drug use: No    Sexual activity: Not on file   Lifestyle    Physical activity:     Days per week: Not on file     Minutes per session: Not on file    Stress: Not on file   Relationships    Social connections:     Talks on phone: Not on file     Gets together: Not on file     Attends Jain service: Not on file     Active member of club or organization: Not on file     Attends meetings of clubs or organizations: Not on file     Relationship status: Not on file    Intimate partner violence:     Fear of current or ex partner: Not on file     Emotionally abused: Not on file     Physically abused: Not on file     Forced sexual activity: Not on file   Other Topics Concern    Not on file   Social History Narrative    Not on file       No family history on file. I have reviewed the patient's past medical history, past surgical history, allergies, medications, social and family history and I have made updates where appropriate.       Review of Systems  Positive responses are highlighted in bold    Constitutional:  Fever, Chills, Night Sweats, Fatigue, Unexpected changes in weight  Eyes:  Eye discharge, Eye pain, Eye redness, Visual disturbances   HENT:  Ear pain, Tinnitus, Nosebleeds, Trouble swallowing, Hearing loss, Sore throat  Cardiovascular:  Chest Pain, Palpitations, Orthopnea, Paroxysmal Nocturnal Dyspnea  Respiratory:  Cough, Wheezing, Shortness of breath, Chest tightness, Apnea  Gastrointestinal:  Nausea, Vomiting, Diarrhea, Constipation, Heartburn, Blood in stool  Genitourinary:  Difficulty or painful urination, Flank pain, Change in frequency, Urgency  Skin:  Color change, Rash, Itching, Wound  Psychiatric:  Hallucinations, Anxiety, Depression, Suicidal ideation  Hematological:  Enlarged glands, Easy bleeding, Easily bruising  Musculoskeletal:  Joint pain, Back pain, Gait problems, Joint swelling, Myalgias  Neurological:  Dizziness, Headaches, Presyncope, Numbness, Seizures, Tremors  Allergy:  Environmental allergies, Food allergies  Endocrine:  Heat Intolerance, Cold Intolerance, Polydipsia, Polyphagia, Polyuria    Lab Results   Component Value Date     05/25/2019    K 4.5 05/25/2019     05/25/2019    CO2 27 05/25/2019    BUN 13 05/25/2019    CREATININE 0.7 05/25/2019    GLUCOSE 106 05/25/2019    CALCIUM 9.8 05/25/2019    PROT 7.0 05/25/2019    LABALBU 4.3 05/25/2019    BILITOT 1.0 05/25/2019    ALKPHOS 98 05/25/2019    AST 19 05/25/2019    ALT 19 05/25/2019    LABGLOM 87 (A) 05/25/2019       PHYSICAL EXAM:  Vitals:    06/27/19 1734   BP: 124/76   Pulse: 70   Resp: 16   Temp: 97.2 °F (36.2 °C)   TempSrc: Oral   Weight: 235 lb (106.6 kg)   Height: 5' 2.5\" (1.588 m)     Body mass index is 42.3 kg/m².          VS Reviewed  General Appearance: A&O x 3, No acute distress,well developed and well- nourished  Head: normocephalic and atraumatic  Eyes: pupils equal, round, and reactive to light, extraocular eye movements intact, conjunctivae and eye lids without erythema  Neck: supple and non-tender without mass, no thyromegaly or thyroid nodules, no cervical lymphadenopathy  Pulmonary/Chest: clear to diminished t/o, no w/r/r  Cardiovascular: S1 and S2 auscultated w/ RRR. No murmurs, rubs, clicks, or gallops, distal pulses intact. Abdomen: soft, non-tender, non-distended, bowl sounds physiologic,  no rebound or guarding, no masses or hernias noted. Liver and spleen without enlargement. Extremities: no cyanosis, clubbing or edema of the lower extremities  Musculoskeletal: No joint swelling or gross deformity   Neuro:  Alert, 5/5 strength globally and symmetrically  Psych: Affect appropriate. Mood normal. Thought process is normal without evidence of depression or psychosis. Good insight and appropriate interaction. Cognition and memory appear to be intact. Skin: warm and dry  Lymph:  No cervical, auricular or supraclavicular lymph nodes palpated      ASSESSMENT & PLAN  Michelle Dia was seen today for weight loss and hypertension. Diagnoses and all orders for this visit:    Class 3 severe obesity due to excess calories with serious comorbidity and body mass index (BMI) of 40.0 to 44.9 in adult ([facility])  -     buPROPion (WELLBUTRIN XL) 150 MG extended release tablet; Take 1 tablet by mouth every morning    Major depressive disorder with single episode, in partial remission ([facility])  -     buPROPion (WELLBUTRIN XL) 150 MG extended release tablet; Take 1 tablet by mouth every morning        - con't Wellbutrin  - work on diet/exercise  - f/u 3 months for weight check      DISPOSITION    Return in about 3 months (around 9/27/2019) for obesity. Michelle Dia released without restrictions. PATIENT COUNSELING    Counseling was provided today regarding the following topics: Healthy eating habits, Regular exercise, substance abuse and healthy sleep habits.     Michelle Dia received counseling on the following healthy behaviors: medication adherence    Patient given educational materials on: See Attached    I have instructed Michelle Dia to complete a self tracking handout on none and instructed them to bring it with them to her next appointment. Barriers to learning and self management: none    Discussed use, benefit, and side effects of prescribed medications. Barriers to medication compliance addressed. All patient questions answered. Pt voiced understanding.        Electronically signed by MICK Jacques CNP on 6/27/2019 at 6:17 PM

## 2019-10-10 ENCOUNTER — OFFICE VISIT (OUTPATIENT)
Dept: FAMILY MEDICINE CLINIC | Age: 54
End: 2019-10-10
Payer: COMMERCIAL

## 2019-10-10 VITALS
WEIGHT: 237.8 LBS | HEART RATE: 88 BPM | RESPIRATION RATE: 16 BRPM | DIASTOLIC BLOOD PRESSURE: 88 MMHG | HEIGHT: 62 IN | SYSTOLIC BLOOD PRESSURE: 128 MMHG | TEMPERATURE: 98.3 F | BODY MASS INDEX: 43.76 KG/M2

## 2019-10-10 DIAGNOSIS — E66.01 CLASS 3 SEVERE OBESITY DUE TO EXCESS CALORIES WITH SERIOUS COMORBIDITY AND BODY MASS INDEX (BMI) OF 40.0 TO 44.9 IN ADULT (HCC): ICD-10-CM

## 2019-10-10 DIAGNOSIS — F32.4 MAJOR DEPRESSIVE DISORDER WITH SINGLE EPISODE, IN PARTIAL REMISSION (HCC): ICD-10-CM

## 2019-10-10 DIAGNOSIS — F17.210 CIGARETTE NICOTINE DEPENDENCE WITHOUT COMPLICATION: ICD-10-CM

## 2019-10-10 DIAGNOSIS — Z23 NEEDS FLU SHOT: Primary | ICD-10-CM

## 2019-10-10 DIAGNOSIS — M25.552 LEFT HIP PAIN: ICD-10-CM

## 2019-10-10 PROCEDURE — 90471 IMMUNIZATION ADMIN: CPT | Performed by: NURSE PRACTITIONER

## 2019-10-10 PROCEDURE — 99406 BEHAV CHNG SMOKING 3-10 MIN: CPT | Performed by: NURSE PRACTITIONER

## 2019-10-10 PROCEDURE — 90686 IIV4 VACC NO PRSV 0.5 ML IM: CPT | Performed by: NURSE PRACTITIONER

## 2019-10-10 PROCEDURE — 99214 OFFICE O/P EST MOD 30 MIN: CPT | Performed by: NURSE PRACTITIONER

## 2019-10-10 RX ORDER — BUPROPION HYDROCHLORIDE 300 MG/1
300 TABLET ORAL EVERY MORNING
Qty: 90 TABLET | Refills: 0 | Status: SHIPPED | OUTPATIENT
Start: 2019-10-10 | End: 2020-03-14 | Stop reason: SDUPTHER

## 2019-10-21 ENCOUNTER — TELEPHONE (OUTPATIENT)
Dept: FAMILY MEDICINE CLINIC | Age: 54
End: 2019-10-21

## 2019-10-21 DIAGNOSIS — M16.12 ARTHRITIS OF LEFT HIP: ICD-10-CM

## 2019-10-21 DIAGNOSIS — M25.852 LEFT HIP IMPINGEMENT SYNDROME: Primary | ICD-10-CM

## 2020-03-14 RX ORDER — CITALOPRAM 40 MG/1
40 TABLET ORAL DAILY
Qty: 90 TABLET | Refills: 3 | Status: SHIPPED | OUTPATIENT
Start: 2020-03-14 | End: 2020-10-01 | Stop reason: SDUPTHER

## 2020-03-14 RX ORDER — OMEPRAZOLE 20 MG/1
20 CAPSULE, DELAYED RELEASE ORAL DAILY
Qty: 90 CAPSULE | Refills: 3 | Status: SHIPPED | OUTPATIENT
Start: 2020-03-14 | End: 2020-10-01 | Stop reason: SDUPTHER

## 2020-03-14 RX ORDER — TIZANIDINE 4 MG/1
4 TABLET ORAL EVERY 8 HOURS PRN
Qty: 60 TABLET | Refills: 2 | Status: SHIPPED | OUTPATIENT
Start: 2020-03-14 | End: 2020-10-01 | Stop reason: SDUPTHER

## 2020-03-14 RX ORDER — ATORVASTATIN CALCIUM 80 MG/1
80 TABLET, FILM COATED ORAL DAILY
Qty: 90 TABLET | Refills: 3 | Status: SHIPPED | OUTPATIENT
Start: 2020-03-14 | End: 2020-10-01 | Stop reason: SDUPTHER

## 2020-03-14 RX ORDER — BUPROPION HYDROCHLORIDE 300 MG/1
300 TABLET ORAL EVERY MORNING
Qty: 90 TABLET | Refills: 3 | Status: SHIPPED | OUTPATIENT
Start: 2020-03-14 | End: 2020-10-01 | Stop reason: SDUPTHER

## 2020-03-14 RX ORDER — IBUPROFEN 800 MG/1
800 TABLET ORAL EVERY 6 HOURS PRN
Qty: 360 TABLET | Refills: 0 | Status: SHIPPED | OUTPATIENT
Start: 2020-03-14 | End: 2020-10-01 | Stop reason: SDUPTHER

## 2020-06-25 ENCOUNTER — TELEPHONE (OUTPATIENT)
Dept: ADMINISTRATIVE | Age: 55
End: 2020-06-25

## 2020-06-26 ENCOUNTER — OFFICE VISIT (OUTPATIENT)
Dept: FAMILY MEDICINE CLINIC | Age: 55
End: 2020-06-26
Payer: COMMERCIAL

## 2020-06-26 VITALS
TEMPERATURE: 98.2 F | OXYGEN SATURATION: 98 % | BODY MASS INDEX: 40.43 KG/M2 | DIASTOLIC BLOOD PRESSURE: 84 MMHG | WEIGHT: 236.8 LBS | HEIGHT: 64 IN | SYSTOLIC BLOOD PRESSURE: 138 MMHG | RESPIRATION RATE: 12 BRPM | HEART RATE: 78 BPM

## 2020-06-26 PROCEDURE — 99213 OFFICE O/P EST LOW 20 MIN: CPT | Performed by: NURSE PRACTITIONER

## 2020-06-26 PROCEDURE — 96372 THER/PROPH/DIAG INJ SC/IM: CPT | Performed by: NURSE PRACTITIONER

## 2020-06-26 RX ORDER — PREDNISONE 20 MG/1
40 TABLET ORAL DAILY
Qty: 10 TABLET | Refills: 0 | Status: SHIPPED | OUTPATIENT
Start: 2020-06-26 | End: 2020-07-01

## 2020-06-26 RX ORDER — HYDROCODONE BITARTRATE AND ACETAMINOPHEN 5; 325 MG/1; MG/1
1 TABLET ORAL EVERY 6 HOURS PRN
Qty: 12 TABLET | Refills: 0 | Status: SHIPPED | OUTPATIENT
Start: 2020-06-26 | End: 2020-06-29

## 2020-06-26 RX ORDER — METHYLPREDNISOLONE ACETATE 40 MG/ML
40 INJECTION, SUSPENSION INTRA-ARTICULAR; INTRALESIONAL; INTRAMUSCULAR; SOFT TISSUE ONCE
Status: COMPLETED | OUTPATIENT
Start: 2020-06-26 | End: 2020-06-26

## 2020-06-26 RX ORDER — KETOROLAC TROMETHAMINE 30 MG/ML
30 INJECTION, SOLUTION INTRAMUSCULAR; INTRAVENOUS ONCE
Status: COMPLETED | OUTPATIENT
Start: 2020-06-26 | End: 2020-06-26

## 2020-06-26 RX ADMIN — METHYLPREDNISOLONE ACETATE 40 MG: 40 INJECTION, SUSPENSION INTRA-ARTICULAR; INTRALESIONAL; INTRAMUSCULAR; SOFT TISSUE at 11:19

## 2020-06-26 RX ADMIN — KETOROLAC TROMETHAMINE 30 MG: 30 INJECTION, SOLUTION INTRAMUSCULAR; INTRAVENOUS at 11:18

## 2020-06-26 NOTE — PATIENT INSTRUCTIONS
middle, and lower back. Hold this stretch for as long as it feels comfortable, or about 15 to 30 seconds. 3. Return to the starting position with a flat back while you are on your hands and knees. 4. Let your back sway by pressing your stomach toward the floor. Lift your buttocks toward the ceiling. 5. Hold this position for 15 to 30 seconds. 6. Repeat 2 to 4 times. Follow-up care is a key part of your treatment and safety. Be sure to make and go to all appointments, and call your doctor if you are having problems. It's also a good idea to know your test results and keep a list of the medicines you take. Where can you learn more? Go to [email or web address] org and sign in to your [employer] account. Enter U170 in the [employer] box to learn more about \"[employer]: Exercises. \"     If you do not have an account, please click on the \"Sign Up Now\" link. Current as of: March 2, 2020               Content Version: 12.5  © 2006-2020 [employer]. Care instructions adapted under license by [facility] ([facility]). If you have questions about a medical condition or this instruction, always ask your healthcare professional. [place] [address] any warranty or liability for your use of this information.

## 2020-06-26 NOTE — PROGRESS NOTES
2 tablets by mouth daily for 5 days 10 tablet 0    HYDROcodone-acetaminophen (NORCO) 5-325 MG per tablet Take 1 tablet by mouth every 6 hours as needed for Pain for up to 3 days. Intended supply: 5 days. Take lowest dose possible to manage pain 12 tablet 0    atorvastatin (LIPITOR) 80 MG tablet Take 1 tablet by mouth daily 90 tablet 3    omeprazole (PRILOSEC) 20 MG delayed release capsule Take 1 capsule by mouth Daily 90 capsule 3    citalopram (CELEXA) 40 MG tablet Take 1 tablet by mouth daily 90 tablet 3    tiZANidine (ZANAFLEX) 4 MG tablet Take 1 tablet by mouth every 8 hours as needed (right shoulder pain) 60 tablet 2    ibuprofen (ADVIL;MOTRIN) 800 MG tablet Take 1 tablet by mouth every 6 hours as needed for Pain 360 tablet 0    buPROPion (WELLBUTRIN XL) 300 MG extended release tablet Take 1 tablet by mouth every morning 90 tablet 3    albuterol sulfate HFA (PROAIR HFA) 108 (90 Base) MCG/ACT inhaler Inhale 2 puffs into the lungs every 6 hours as needed for Wheezing 1 Inhaler 3     No current facility-administered medications for this visit. Orders Placed This Encounter   Medications    methylPREDNISolone acetate (DEPO-MEDROL) injection 40 mg    ketorolac (TORADOL) injection 30 mg    predniSONE (DELTASONE) 20 MG tablet     Sig: Take 2 tablets by mouth daily for 5 days     Dispense:  10 tablet     Refill:  0    HYDROcodone-acetaminophen (NORCO) 5-325 MG per tablet     Sig: Take 1 tablet by mouth every 6 hours as needed for Pain for up to 3 days. Intended supply: 5 days. Take lowest dose possible to manage pain     Dispense:  12 tablet     Refill:  0     Reduce doses taken as pain becomes manageable         All medications reviewed and reconciled, including OTC and herbal medications. Updated list given to patient.        Patient Active Problem List   Diagnosis    Depression    Hyperlipidemia    COPD, mild (HCC)    Class 3 severe obesity due to excess calories with serious comorbidity and body distress,well developed and well- nourished  Head: normocephalic and atraumatic  Eyes: pupils equal, round, and reactive to light, extraocular eye movements intact, conjunctivae and eye lids without erythema  Neck: supple and non-tender without mass, no thyromegaly or thyroid nodules, no cervical lymphadenopathy  Pulmonary/Chest: clear to diminished t/o, no w/r/r  Cardiovascular: S1 and S2 auscultated w/ RRR. No murmurs, rubs, clicks, or gallops, distal pulses intact. Abdomen: soft, non-tender, non-distended, bowl sounds physiologic,  no rebound or guarding, no masses or hernias noted. Liver and spleen without enlargement. Extremities: no cyanosis, clubbing or edema of the lower extremities  Musculoskeletal: No joint swelling or gross deformity   Lumbar: + left sided low back pain with palpation, + straight leg maneuver left side, strength 5/5 bilat  Neuro:  Alert, 5/5 strength globally and symmetrically  Psych: Affect appropriate. Mood normal. Thought process is normal without evidence of depression or psychosis. Good insight and appropriate interaction. Cognition and memory appear to be intact. Skin: warm and dry  Lymph:  No cervical, auricular or supraclavicular lymph nodes palpated      ASSESSMENT & PLAN  Alexa Wilkins was seen today for hip pain. Diagnoses and all orders for this visit:    Acute left lumbar radiculopathy  -     methylPREDNISolone acetate (DEPO-MEDROL) injection 40 mg  -     ketorolac (TORADOL) injection 30 mg  -     predniSONE (DELTASONE) 20 MG tablet; Take 2 tablets by mouth daily for 5 days  -     HYDROcodone-acetaminophen (NORCO) 5-325 MG per tablet; Take 1 tablet by mouth every 6 hours as needed for Pain for up to 3 days. Intended supply: 5 days.  Take lowest dose possible to manage pain        - con't Motrin and muscle relaxers  - add prednisone and short Rx for Norco  - HEP  - f/u if no improvement and would consider PT    Controlled Substance Monitoring:    Acute and Chronic Pain Monitoring:   RX Monitoring 6/26/2020   Attestation -   Periodic Controlled Substance Monitoring No signs of potential drug abuse or diversion identified. DISPOSITION    Return in about 3 months (around 9/26/2020) for chronic conditions, needs OV. Carolina Wood released without restrictions. PATIENT COUNSELING    Counseling was provided today regarding the following topics: Healthy eating habits, Regular exercise, substance abuse and healthy sleep habits. Carolina Wood received counseling on the following healthy behaviors: medication adherence    Patient given educational materials on: See Attached    I have instructed Carolina Wood to complete a self tracking handout on none and instructed them to bring it with them to her next appointment. Barriers to learning and self management: none    Discussed use, benefit, and side effects of prescribed medications. Barriers to medication compliance addressed. All patient questions answered. Pt voiced understanding.        Electronically signed by MICK Ovalle CNP on 6/26/2020 at 11:22 AM

## 2020-10-01 ENCOUNTER — OFFICE VISIT (OUTPATIENT)
Dept: FAMILY MEDICINE CLINIC | Age: 55
End: 2020-10-01
Payer: COMMERCIAL

## 2020-10-01 VITALS
WEIGHT: 235 LBS | HEART RATE: 79 BPM | RESPIRATION RATE: 18 BRPM | SYSTOLIC BLOOD PRESSURE: 138 MMHG | TEMPERATURE: 97.8 F | DIASTOLIC BLOOD PRESSURE: 78 MMHG | BODY MASS INDEX: 40.62 KG/M2

## 2020-10-01 PROBLEM — K21.9 GASTROESOPHAGEAL REFLUX DISEASE: Status: ACTIVE | Noted: 2020-10-01

## 2020-10-01 PROBLEM — F17.210 CIGARETTE NICOTINE DEPENDENCE WITHOUT COMPLICATION: Status: ACTIVE | Noted: 2020-10-01

## 2020-10-01 PROCEDURE — 99214 OFFICE O/P EST MOD 30 MIN: CPT | Performed by: NURSE PRACTITIONER

## 2020-10-01 PROCEDURE — 99406 BEHAV CHNG SMOKING 3-10 MIN: CPT | Performed by: NURSE PRACTITIONER

## 2020-10-01 RX ORDER — CITALOPRAM 40 MG/1
40 TABLET ORAL DAILY
Qty: 90 TABLET | Refills: 3 | Status: SHIPPED | OUTPATIENT
Start: 2020-10-01 | End: 2021-11-11 | Stop reason: SDUPTHER

## 2020-10-01 RX ORDER — OMEPRAZOLE 20 MG/1
20 CAPSULE, DELAYED RELEASE ORAL DAILY
Qty: 90 CAPSULE | Refills: 3 | Status: SHIPPED | OUTPATIENT
Start: 2020-10-01 | End: 2021-11-11 | Stop reason: SDUPTHER

## 2020-10-01 RX ORDER — IBUPROFEN 800 MG/1
800 TABLET ORAL EVERY 6 HOURS PRN
Qty: 360 TABLET | Refills: 0 | OUTPATIENT
Start: 2020-10-01 | End: 2021-10-31

## 2020-10-01 RX ORDER — TIZANIDINE 4 MG/1
4 TABLET ORAL EVERY 8 HOURS PRN
Qty: 90 TABLET | Refills: 0 | Status: SHIPPED | OUTPATIENT
Start: 2020-10-01 | End: 2021-10-31

## 2020-10-01 RX ORDER — BUPROPION HYDROCHLORIDE 300 MG/1
300 TABLET ORAL EVERY MORNING
Qty: 90 TABLET | Refills: 3 | Status: SHIPPED | OUTPATIENT
Start: 2020-10-01 | End: 2021-11-11 | Stop reason: ALTCHOICE

## 2020-10-01 RX ORDER — ATORVASTATIN CALCIUM 80 MG/1
80 TABLET, FILM COATED ORAL DAILY
Qty: 90 TABLET | Refills: 3 | Status: SHIPPED | OUTPATIENT
Start: 2020-10-01 | End: 2021-11-11 | Stop reason: SDUPTHER

## 2020-10-01 NOTE — PROGRESS NOTES
Chief Complaint   Patient presents with    3 Month Follow-Up     chronic conditions    Back Pain     cant inject due to close to spine       History obtained from chart review and the patient. SUBJECTIVE:  Leah Manzo is a 54 y.o. female that presents today for follow up for chronic conditions     COPD    HPI:    Current medication regimen - nothing  Compliant with medications? She stopped the [place] and albuterol    Limitations in function - none  Does patient smoke? Yes    Chronic cough?: no  Chest pain/Tightness?:  no  Shortness of breath?: yes  Wheezing?  no    Last PFTs - 2018    Hospitalized and/or intubated in the past?: No  Number of times prescribed oral steroids in the past year - 0  Influenza vaccine up to date? Yes  Pneumococcal vaccine up to date? Yes    Tobacco Dependence    Currently smokes 0.5 ppd, has smoked for 35 years  Attempts to quit in the past? Yes  Interventions that have helped with previous cessation attempts: Wellbutrin, patches  Motivation to quit (scale of 1-10):  0/10    MDD  Moods are doing well. Denies any depression, denies any SI/HI. Taking Wellbutrin and Celexa and tolerating fine. Dyslipidemia    Current Medication regimen - Lipitor 80 mg  Tolerating medications well? Yes  Personal History of CAD? No   Hx of CAD in first degree relatives? No  Current smoker? Yes  History of HTN? No  History of DM? No  Goal LDL - < 100    Shortness of breath or chest pain? No  Neurologic changes? No  Extremity edema?  No    Lab Results   Component Value Date    LDLCALC 47 05/25/2019     BP Readings from Last 3 Encounters:   10/01/20 138/78   06/26/20 138/84   10/10/19 128/88     Wt Readings from Last 3 Encounters:   10/01/20 235 lb (106.6 kg)   06/26/20 236 lb 12.8 oz (107.4 kg)   10/10/19 237 lb 12.8 oz (107.9 kg)     Age/Gender Health Maintenance    Lipid   Lab Results   Component Value Date    CHOL 122 01/06/2018    CHOL 230 (H) 09/02/2017    CHOL 215 (H) 05/20/2017 Lab Results   Component Value Date    TRIG 112 01/06/2018    TRIG 179 09/02/2017    TRIG 155 05/20/2017     Lab Results   Component Value Date    HDL 42 05/25/2019    HDL 47 01/06/2018    HDL 38 09/02/2017     Lab Results   Component Value Date    LDLCALC 47 05/25/2019    LDLCALC 53 01/06/2018    [address] 156 09/02/2017     DM Screen  Lab Results   Component Value Date    LABA1C 5.5 05/25/2019     Colon Cancer Screening - had 6-7 years Dr. Annika Vidales (Age 54 to [de-identified] with 27 pack year hx, current smoker or quit within past 15 years) - n/a    Tetanus - needs  Influenza Vaccine - needs  Pneumonia Vaccine - 65  Zostavax - 50  HPV Vaccine - n/a    Breast Cancer Screening - mammogram 6/6/19  Cervical Cancer Screening - UTD  Osteoporosis Screening - 72    AAA Screening - n/a    Falls screening - n/a    Current Outpatient Medications   Medication Sig Dispense Refill    atorvastatin (LIPITOR) 80 MG tablet Take 1 tablet by mouth daily 90 tablet 3    omeprazole (PRILOSEC) 20 MG delayed release capsule Take 1 capsule by mouth Daily 90 capsule 3    citalopram (CELEXA) 40 MG tablet Take 1 tablet by mouth daily 90 tablet 3    tiZANidine (ZANAFLEX) 4 MG tablet Take 1 tablet by mouth every 8 hours as needed (right shoulder pain) 90 tablet 0    ibuprofen (ADVIL;MOTRIN) 800 MG tablet Take 1 tablet by mouth every 6 hours as needed for Pain 360 tablet 0    buPROPion (WELLBUTRIN XL) 300 MG extended release tablet Take 1 tablet by mouth every morning 90 tablet 3     No current facility-administered medications for this visit.       Orders Placed This Encounter   Medications    atorvastatin (LIPITOR) 80 MG tablet     Sig: Take 1 tablet by mouth daily     Dispense:  90 tablet     Refill:  3    omeprazole (PRILOSEC) 20 MG delayed release capsule     Sig: Take 1 capsule by mouth Daily     Dispense:  90 capsule     Refill:  3    citalopram (CELEXA) 40 MG tablet     Sig: Take 1 tablet by mouth daily     Dispense: Substance and Sexual Activity    Alcohol use: Yes    Drug use: No    Sexual activity: Not on file   Lifestyle    Physical activity     Days per week: Not on file     Minutes per session: Not on file    Stress: Not on file   Relationships    Social connections     Talks on phone: Not on file     Gets together: Not on file     Attends Spiritism service: Not on file     Active member of club or organization: Not on file     Attends meetings of clubs or organizations: Not on file     Relationship status: Not on file    Intimate partner violence     Fear of current or ex partner: Not on file     Emotionally abused: Not on file     Physically abused: Not on file     Forced sexual activity: Not on file   Other Topics Concern    Not on file   Social History Narrative    Not on file       No family history on file. I have reviewed the patient's past medical history, past surgical history, allergies, medications, social and family history and I have made updates where appropriate.       Review of Systems  Positive responses are highlighted in bold    Constitutional:  Fever, Chills, Night Sweats, Fatigue, Unexpected changes in weight  Eyes:  Eye discharge, Eye pain, Eye redness, Visual disturbances   HENT:  Ear pain, Tinnitus, Nosebleeds, Trouble swallowing, Hearing loss, Sore throat  Cardiovascular:  Chest Pain, Palpitations, Orthopnea, Paroxysmal Nocturnal Dyspnea  Respiratory:  Cough, Wheezing, Shortness of breath, Chest tightness, Apnea  Gastrointestinal:  Nausea, Vomiting, Diarrhea, Constipation, Heartburn, Blood in stool  Genitourinary:  Difficulty or painful urination, Flank pain, Change in frequency, Urgency  Skin:  Color change, Rash, Itching, Wound  Psychiatric:  Hallucinations, Anxiety, Depression, Suicidal ideation  Hematological:  Enlarged glands, Easy bleeding, Easily bruising  Musculoskeletal:  Joint pain, Back pain, Gait problems, Joint swelling, Myalgias  Neurological:  Dizziness, Headaches, Presyncope, Numbness, Seizures, Tremors  Allergy:  Environmental allergies, Food allergies  Endocrine:  Heat Intolerance, Cold Intolerance, Polydipsia, Polyphagia, Polyuria    Lab Results   Component Value Date     05/25/2019    K 4.5 05/25/2019     05/25/2019    CO2 27 05/25/2019    BUN 13 05/25/2019    CREATININE 0.7 05/25/2019    GLUCOSE 106 05/25/2019    CALCIUM 9.8 05/25/2019    PROT 7.0 05/25/2019    LABALBU 4.3 05/25/2019    BILITOT 1.0 05/25/2019    ALKPHOS 98 05/25/2019    AST 19 05/25/2019    ALT 19 05/25/2019    LABGLOM 87 (A) 05/25/2019     Lab Results   Component Value Date    WBC 6.7 05/25/2019    HGB 15.0 05/25/2019    HCT 45.4 05/25/2019    MCV 97.2 05/25/2019     05/25/2019     Lab Results   Component Value Date    TSH 2.050 05/25/2019         PHYSICAL EXAM:  Vitals:    10/01/20 1637 10/01/20 1647   BP: (!) 152/94 138/78   Pulse: 79    Resp: 18    Temp: 97.8 °F (36.6 °C)    Weight: 235 lb (106.6 kg)      Body mass index is 40.62 kg/m². VS Reviewed  General Appearance: A&O x 3, No acute distress,well developed and well- nourished  Head: normocephalic and atraumatic  Eyes: pupils equal, round, and reactive to light, extraocular eye movements intact, conjunctivae and eye lids without erythema  Neck: supple and non-tender without mass, no thyromegaly or thyroid nodules, no cervical lymphadenopathy  Pulmonary/Chest: clear to diminished t/o, no w/r/r  Cardiovascular: S1 and S2 auscultated w/ RRR. No murmurs, rubs, clicks, or gallops, distal pulses intact. Abdomen: soft, non-tender, non-distended, bowl sounds physiologic,  no rebound or guarding, no masses or hernias noted. Liver and spleen without enlargement. Extremities: no cyanosis, clubbing or edema of the lower extremities  Musculoskeletal: No joint swelling or gross deformity   Neuro:  Alert, 5/5 strength globally and symmetrically  Psych: Affect appropriate.   Mood normal. Thought process is normal without evidence of COUNSELING    Counseling was provided today regarding the following topics: Healthy eating habits, Regular exercise, substance abuse and healthy sleep habits. Christine Wise received counseling on the following healthy behaviors: medication adherence    Patient given educational materials on: See Attached    I have instructed Christine Wise to complete a self tracking handout on none and instructed them to bring it with them to her next appointment. Barriers to learning and self management: none    Discussed use, benefit, and side effects of prescribed medications. Barriers to medication compliance addressed. All patient questions answered. Pt voiced understanding.        Electronically signed by MICK Shore CNP on 10/1/2020 at 4:54 PM

## 2020-10-05 RX ORDER — TIZANIDINE 4 MG/1
4 TABLET ORAL EVERY 8 HOURS PRN
Qty: 90 TABLET | Refills: 0 | OUTPATIENT
Start: 2020-10-05

## 2020-11-24 ENCOUNTER — HOSPITAL ENCOUNTER (OUTPATIENT)
Age: 55
Discharge: HOME | End: 2020-11-24
Payer: COMMERCIAL

## 2020-11-24 ENCOUNTER — TELEPHONE (OUTPATIENT)
Dept: FAMILY MEDICINE CLINIC | Age: 55
End: 2020-11-24

## 2020-11-24 LAB
ALBUMIN SERPL-MCNC: 4.3 G/DL (ref 3.5–5.1)
ALP BLD-CCNC: 95 U/L (ref 38–126)
ALT SERPL-CCNC: 17 U/L (ref 11–66)
ANION GAP SERPL CALCULATED.3IONS-SCNC: 11 MEQ/L (ref 8–16)
AST SERPL-CCNC: 17 U/L (ref 5–40)
BILIRUB SERPL-MCNC: 0.7 MG/DL (ref 0.3–1.2)
BUN BLDV-MCNC: 12 MG/DL (ref 7–22)
CALCIUM SERPL-MCNC: 9.9 MG/DL (ref 8.5–10.5)
CHLORIDE BLD-SCNC: 103 MEQ/L (ref 98–111)
CHOLESTEROL, FASTING: 125 MG/DL (ref 100–199)
CO2: 27 MEQ/L (ref 23–33)
CREAT SERPL-MCNC: 0.7 MG/DL (ref 0.4–1.2)
GFR SERPL CREATININE-BSD FRML MDRD: 87 ML/MIN/1.73M2
GLUCOSE BLD-MCNC: 106 MG/DL (ref 70–108)
HDLC SERPL-MCNC: 48 MG/DL
LDL CHOLESTEROL CALCULATED: 54 MG/DL
POTASSIUM SERPL-SCNC: 4.4 MEQ/L (ref 3.5–5.2)
SODIUM BLD-SCNC: 141 MEQ/L (ref 135–145)
TOTAL PROTEIN: 6.7 G/DL (ref 6.1–8)
TRIGLYCERIDE, FASTING: 114 MG/DL (ref 0–199)

## 2020-11-24 PROCEDURE — 80053 COMPREHEN METABOLIC PANEL: CPT

## 2020-11-24 PROCEDURE — 36415 COLL VENOUS BLD VENIPUNCTURE: CPT

## 2020-11-24 PROCEDURE — 80061 LIPID PANEL: CPT

## 2020-11-24 NOTE — TELEPHONE ENCOUNTER
----- Message from MICK Dawson CNP sent at 11/24/2020  1:29 PM EST -----  Let Judson Schmidt know her labs are all stable and within appropriate ranges

## 2020-12-01 ENCOUNTER — VIRTUAL VISIT (OUTPATIENT)
Dept: FAMILY MEDICINE CLINIC | Age: 55
End: 2020-12-01
Payer: COMMERCIAL

## 2020-12-01 PROCEDURE — 99214 OFFICE O/P EST MOD 30 MIN: CPT | Performed by: NURSE PRACTITIONER

## 2020-12-01 RX ORDER — PREDNISONE 20 MG/1
40 TABLET ORAL DAILY
Qty: 10 TABLET | Refills: 0 | Status: SHIPPED | OUTPATIENT
Start: 2020-12-01 | End: 2020-12-06

## 2020-12-01 RX ORDER — DOXYCYCLINE HYCLATE 100 MG
100 TABLET ORAL 2 TIMES DAILY
Qty: 20 TABLET | Refills: 0 | Status: SHIPPED | OUTPATIENT
Start: 2020-12-01 | End: 2020-12-11

## 2020-12-01 RX ORDER — ALBUTEROL SULFATE 90 UG/1
2 AEROSOL, METERED RESPIRATORY (INHALATION) EVERY 6 HOURS PRN
Qty: 1 INHALER | Refills: 3 | Status: SHIPPED | OUTPATIENT
Start: 2020-12-01

## 2020-12-01 NOTE — PROGRESS NOTES
2020    CC: cough, SOB    TELEHEALTH EVALUATION -- Audio/Visual (During YJHUI-37 public health emergency)    HPI:    Kulwinder Kyle (:  1965) has requested an audio/video evaluation for the following concern(s):    Cough, SOB, wheezing, chills, body aches, light headed since   Also had sore throat, HA  Very fatigued  Nasal congestion  No changes in taste or smell, N, V, rashes,   Diarrhea    Review of Systems    Prior to Visit Medications    Medication Sig Taking? Authorizing Provider   predniSONE (DELTASONE) 20 MG tablet Take 2 tablets by mouth daily for 5 days Yes MICK Zavaleta CNP   doxycycline hyclate (VIBRA-TABS) 100 MG tablet Take 1 tablet by mouth 2 times daily for 10 days Yes MICK Zavaleta CNP   albuterol sulfate HFA (PROAIR HFA) 108 (90 Base) MCG/ACT inhaler Inhale 2 puffs into the lungs every 6 hours as needed for Wheezing Yes MICK Zavaleta CNP   atorvastatin (LIPITOR) 80 MG tablet Take 1 tablet by mouth daily  MICK Woodard CNP   omeprazole (PRILOSEC) 20 MG delayed release capsule Take 1 capsule by mouth Daily  MICK Woodard CNP   citalopram (CELEXA) 40 MG tablet Take 1 tablet by mouth daily  MICK Woodard CNP   tiZANidine (ZANAFLEX) 4 MG tablet Take 1 tablet by mouth every 8 hours as needed (right shoulder pain)  MICK Woodard CNP   ibuprofen (ADVIL;MOTRIN) 800 MG tablet Take 1 tablet by mouth every 6 hours as needed for Pain  MICK Woodard CNP   buPROPion (WELLBUTRIN XL) 300 MG extended release tablet Take 1 tablet by mouth every morning  MICK Woodard CNP   lisinopril (PRINIVIL;ZESTRIL) 20 MG tablet Take 20 mg by mouth daily  Historical Provider, MD       Social History     Tobacco Use    Smoking status: Current Every Day Smoker     Packs/day: 0.50     Years: 35.00     Pack years: 17.50    Smokeless tobacco: Never Used   Substance Use Topics    Alcohol use: Yes    Drug use:  No PHYSICAL EXAMINATION:  Constitutional: [x] Appears well-developed and well-nourished [x] No apparent distress      [] Abnormal-   Mental status  [x] Alert and awake  [x] Oriented to person/place/time []Able to follow commands      Eyes:  EOM    [x]  Normal  [] Abnormal-  Sclera  [x]  Normal  [] Abnormal -         Discharge [x]  None visible  [] Abnormal -    HENT:   [x] Normocephalic, atraumatic. [] Abnormal   [x] Mouth/Throat: Mucous membranes are moist.     External Ears [x] Normal  [] Abnormal-     Neck: [x] No visualized mass     Pulmonary/Chest: [x] Respiratory effort normal.  [x] No visualized signs of difficulty breathing or respiratory distress        [] Abnormal-      Musculoskeletal:   [] Normal gait with no signs of ataxia         [x] Normal range of motion of neck        [] Abnormal-       Neurological:        [x] No Facial Asymmetry (Cranial nerve 7 motor function) (limited exam to video visit)          [x] No gaze palsy        [] Abnormal-         Skin:        [x] No significant exanthematous lesions or discoloration noted on facial skin         [] Abnormal-            Psychiatric:       [x] Normal Affect [x] No Hallucinations        [] Abnormal-     Other pertinent observable physical exam findings-     ASSESSMENT/PLAN:  1. Chronic cough    - predniSONE (DELTASONE) 20 MG tablet; Take 2 tablets by mouth daily for 5 days  Dispense: 10 tablet; Refill: 0  - doxycycline hyclate (VIBRA-TABS) 100 MG tablet; Take 1 tablet by mouth 2 times daily for 10 days  Dispense: 20 tablet; Refill: 0  - albuterol sulfate HFA (PROAIR HFA) 108 (90 Base) MCG/ACT inhaler; Inhale 2 puffs into the lungs every 6 hours as needed for Wheezing  Dispense: 1 Inhaler; Refill: 3  - COVID-19 Ambulatory; Future    2. Exertional dyspnea    - predniSONE (DELTASONE) 20 MG tablet; Take 2 tablets by mouth daily for 5 days  Dispense: 10 tablet;  Refill: 0 Tamara Garcia is a 54 y.o. female being evaluated by a Virtual Visit (video visit) encounter to address concerns as mentioned above. A caregiver was present when appropriate. Due to this being a TeleHealth encounter (During EDPRO-11 public health emergency), evaluation of the following organ systems was limited: Vitals/Constitutional/EENT/Resp/CV/GI//MS/Neuro/Skin/Heme-Lymph-Imm. Pursuant to the emergency declaration under the [address] [place] [address] and the [employer] Act, this Virtual Visit was conducted with patient's (and/or legal guardian's) consent, to reduce the patient's risk of exposure to COVID-19 and provide necessary medical care. The patient (and/or legal guardian) has also been advised to contact this office for worsening conditions or problems, and seek emergency medical treatment and/or call 911 if deemed necessary. Patient identification was verified at the start of the visit: Yes    Total time spent on this encounter: 25 min    Services were provided through a video synchronous discussion virtually to substitute for in-person clinic visit. Patient and provider were located at their individual homes. --MICK Smiley CNP on 12/1/2020 at 11:09 AM    An electronic signature was used to authenticate this note.

## 2020-12-01 NOTE — LETTER
[address] [facility]  [address] [address]  Phone: [phone]  Fax: [phone]    MICK Macias CNP        December 1, 2020     Patient: Leopoldo Nunes   YOB: 1965   Date of Visit: 12/1/2020       To Whom It May Concern: It is my medical opinion that Clive Dominguez should remain out of work until further notice, pending medical testing for his wife. If you have any questions or concerns, please don't hesitate to call.     Sincerely,        MICK Macias CNP

## 2020-12-01 NOTE — LETTER
[address] [address]  Phone: [phone]  Fax: [phone]    MICK Cast CNP        December 1, 2020     Patient: Chuck Marin   YOB: 1965   Date of Visit: 12/1/2020       To Whom It May Concern: It is my medical opinion that Amina Ramos should remain out of work until further notice, pending medical testing. If you have any questions or concerns, please don't hesitate to call.     Sincerely,        MICK Cast CNP

## 2020-12-02 LAB — SARS-COV-2: NOT DETECTED

## 2020-12-03 ENCOUNTER — TELEPHONE (OUTPATIENT)
Dept: FAMILY MEDICINE CLINIC | Age: 55
End: 2020-12-03

## 2020-12-03 NOTE — TELEPHONE ENCOUNTER
Patient has been informed and voiced understanding and wanted to know if she could be released to return to work? Needs a letter stating so if it is ok and would like the letter emailed to   [email or web address]  Patient also mentioned that her  would need a letter to return to work as well.   Patient would like a call

## 2020-12-03 NOTE — TELEPHONE ENCOUNTER
----- Message from MICK Dempsey CNP sent at 12/2/2020  5:35 PM EST -----  covid negative  Electronically signed by MICK Dempsey CNP on 12/2/2020 at 5:35 PM

## 2020-12-03 NOTE — LETTER
[address] [facility]  [address] [facility] [facility] [address]  Phone: [phone]  Fax: [phone]    Juju BARTON-KALEB       December 3, 2020     Patient: Ba Luz   YOB: 1965   Date of Visit: 12/3/2020       To Whom It May Concern: It is my medical opinion that Baylee Simpson can return to work 12/4/20. If you have any questions or concerns, please don't hesitate to call.     Sincerely,        Juju BARTON-CNP

## 2021-06-08 ENCOUNTER — TELEPHONE (OUTPATIENT)
Dept: FAMILY MEDICINE CLINIC | Age: 56
End: 2021-06-08

## 2021-06-08 DIAGNOSIS — Z12.31 ENCOUNTER FOR SCREENING MAMMOGRAM FOR MALIGNANT NEOPLASM OF BREAST: Primary | ICD-10-CM

## 2021-06-08 NOTE — TELEPHONE ENCOUNTER
Pt informed and verbalized understanding.   Order mailed to the pt, she will go to [facility]

## 2021-10-31 ENCOUNTER — HOSPITAL ENCOUNTER (EMERGENCY)
Age: 56
Discharge: HOME | End: 2021-10-31
Payer: COMMERCIAL

## 2021-10-31 ENCOUNTER — APPOINTMENT (OUTPATIENT)
Dept: GENERAL RADIOLOGY | Age: 56
End: 2021-10-31
Payer: COMMERCIAL

## 2021-10-31 VITALS
DIASTOLIC BLOOD PRESSURE: 88 MMHG | RESPIRATION RATE: 16 BRPM | SYSTOLIC BLOOD PRESSURE: 144 MMHG | HEART RATE: 72 BPM | TEMPERATURE: 98 F | OXYGEN SATURATION: 97 %

## 2021-10-31 DIAGNOSIS — M16.11 ARTHRITIS OF RIGHT HIP: ICD-10-CM

## 2021-10-31 DIAGNOSIS — S39.012A STRAIN OF LUMBAR REGION, INITIAL ENCOUNTER: Primary | ICD-10-CM

## 2021-10-31 PROCEDURE — 73502 X-RAY EXAM HIP UNI 2-3 VIEWS: CPT

## 2021-10-31 PROCEDURE — 99282 EMERGENCY DEPT VISIT SF MDM: CPT

## 2021-10-31 PROCEDURE — 72100 X-RAY EXAM L-S SPINE 2/3 VWS: CPT

## 2021-10-31 PROCEDURE — 6370000000 HC RX 637 (ALT 250 FOR IP): Performed by: PHYSICIAN ASSISTANT

## 2021-10-31 RX ORDER — NAPROXEN 250 MG/1
500 TABLET ORAL ONCE
Status: COMPLETED | OUTPATIENT
Start: 2021-10-31 | End: 2021-10-31

## 2021-10-31 RX ORDER — CYCLOBENZAPRINE HCL 10 MG
10 TABLET ORAL 3 TIMES DAILY PRN
Qty: 21 TABLET | Refills: 0 | Status: SHIPPED | OUTPATIENT
Start: 2021-10-31 | End: 2021-11-07

## 2021-10-31 RX ORDER — TIZANIDINE 4 MG/1
4 TABLET ORAL ONCE
Status: COMPLETED | OUTPATIENT
Start: 2021-10-31 | End: 2021-10-31

## 2021-10-31 RX ORDER — NAPROXEN 500 MG/1
500 TABLET ORAL 2 TIMES DAILY PRN
Qty: 30 TABLET | Refills: 0 | Status: SHIPPED | OUTPATIENT
Start: 2021-10-31 | End: 2021-11-11 | Stop reason: SDUPTHER

## 2021-10-31 RX ORDER — LIDOCAINE 4 G/G
1 PATCH TOPICAL DAILY
Status: DISCONTINUED | OUTPATIENT
Start: 2021-10-31 | End: 2021-10-31 | Stop reason: HOSPADM

## 2021-10-31 RX ADMIN — TIZANIDINE 4 MG: 4 TABLET ORAL at 18:34

## 2021-10-31 RX ADMIN — NAPROXEN 500 MG: 250 TABLET ORAL at 18:34

## 2021-10-31 ASSESSMENT — PAIN DESCRIPTION - ORIENTATION: ORIENTATION: RIGHT

## 2021-10-31 ASSESSMENT — PAIN DESCRIPTION - PAIN TYPE: TYPE: ACUTE PAIN

## 2021-10-31 ASSESSMENT — ENCOUNTER SYMPTOMS
BACK PAIN: 1
EYES NEGATIVE: 1
COUGH: 0
DIARRHEA: 0
NAUSEA: 0
VOMITING: 0
SORE THROAT: 0

## 2021-10-31 ASSESSMENT — PAIN SCALES - GENERAL: PAINLEVEL_OUTOF10: 6

## 2021-10-31 ASSESSMENT — PAIN DESCRIPTION - LOCATION: LOCATION: BACK;HIP

## 2021-10-31 NOTE — ED TRIAGE NOTES
Pt presents to the Ed via lobby with c/o lower back pain and right hip pain. Pt state she feels a shooting pain from her back down her right leg with movement.  Pt states pain started 2 days ago

## 2021-11-01 NOTE — ED PROVIDER NOTES
[facility] EMERGENCY DEPT    EMERGENCY MEDICINE     Pt Name: Celina Rubio  MRN: [id number]  [place] 1965  Date of evaluation: 10/31/2021  Provider: Lidia Roper PA-C    CHIEF COMPLAINT       Chief Complaint   Patient presents with    Back Pain    Hip Pain     right       HISTORY OF PRESENT ILLNESS    Celina Rubio is a pleasant 64 y.o. female who presents to the emergency department for right hip and back pain. Patient states started 2 days ago more so in the late morning or she developed mild right hip and back aching. She states it has gotten progressively worse since then and now the pain is a 6/10 dull ache. States that it is radiates to the knee at times. Takes ibuprofen and Vicodin which mildly helps the pain. Pain is worse with walking, standing up from a seated position, and bending over. When she is sitting she is in very mild pain overall. Has had no prior surgeries or injuries to the lower back. No complaints of numbness and tingling to the lower extremities. No complaints of weakness to the lower extremities. No complaints of bowel or bladder issues. No shooting pain. Triage notes and Nursing notes were reviewed by myself. Any discrepancies are addressed above.     PAST MEDICAL HISTORY     Past Medical History:   Diagnosis Date    COPD, mild ([address] 2/15/2018    Depression     Hyperlipidemia     Hypertension        SURGICAL HISTORY       Past Surgical History:   Procedure Laterality Date    ENDOMETRIAL ABLATION      OVARY REMOVAL      left    TONSILLECTOMY         CURRENT MEDICATIONS       Discharge Medication List as of 10/31/2021  6:29 PM      CONTINUE these medications which have NOT CHANGED    Details   albuterol sulfate HFA (PROAIR HFA) 108 (90 Base) MCG/ACT inhaler Inhale 2 puffs into the lungs every 6 hours as needed for Wheezing, Disp-1 Inhaler, R-3Normal      atorvastatin (LIPITOR) 80 MG tablet Take 1 tablet by mouth daily, Disp-90 tablet,R-3Normal omeprazole (PRILOSEC) 20 MG delayed release capsule Take 1 capsule by mouth Daily, Disp-90 capsule,R-3Normal      citalopram (CELEXA) 40 MG tablet Take 1 tablet by mouth daily, Disp-90 tablet,R-3Normal      buPROPion (WELLBUTRIN XL) 300 MG extended release tablet Take 1 tablet by mouth every morning, Disp-90 tablet,R-3Refill [id number]             ALLERGIES     Flagyl [metronidazole]    FAMILY HISTORY     No family history on file. SOCIAL HISTORY       Social History     Socioeconomic History    Marital status:      Spouse name: Not on file    Number of children: Not on file    Years of education: Not on file    Highest education level: Not on file   Occupational History    Not on file   Tobacco Use    Smoking status: Current Every Day Smoker     Packs/day: 0.50     Years: 35.00     Pack years: 17.50    Smokeless tobacco: Never Used   Substance and Sexual Activity    Alcohol use: Yes    Drug use: No    Sexual activity: Not on file   Other Topics Concern    Not on file   Social History Narrative    Not on file     Social Determinants of Health     Financial Resource Strain:     Difficulty of Paying Living Expenses:    Food Insecurity:     Worried About Running Out of Food in the Last Year:     [address] in the Last Year:    Transportation Needs:     Lack of Transportation (Medical):      Lack of Transportation (Non-Medical):    Physical Activity:     Days of Exercise per Week:     Minutes of Exercise per Session:    Stress:     Feeling of Stress :    Social Connections:     Frequency of Communication with Friends and Family:     Frequency of Social Gatherings with Friends and Family:     Attends Yazdanism Services:     Active Member of Clubs or Organizations:     Attends Club or Organization Meetings:     Marital Status:    Intimate Partner Violence:     Fear of Current or Ex-Partner:     Emotionally Abused:     Physically Abused:     Sexually Abused:        REVIEW Decreased range of motion. Positive right straight leg raise test. Negative left straight leg raise test. No scoliosis. Back:       Comments: Mild tenderness with patient at the right anterior hip. Positive right straight leg test.  Skin is intact. No erythema or signs of infection. No tenderness to bony processes. Lumbar muscular region tighter on the right side than left side. Skin:     General: Skin is warm and dry. Neurological:      Mental Status: She is alert and oriented to person, place, and time. Psychiatric:         Behavior: Behavior normal.         DIFFERENTIAL DIAGNOSIS:   Differential diagnoses are discussed    DIAGNOSTIC RESULTS         RADIOLOGY: non-plain film images(s) such as CT, Ultrasound and MRI are read by the radiologist.    XR LUMBAR SPINE (2-3 VIEWS)   Final Result      No fracture or spondylolisthesis of the lumbar spine. Final report electronically signed by Dr. Salvador Leal on 10/31/2021 5:48 PM      XR HIP RIGHT (2-3 VIEWS)   Final Result      No fracture or dislocation. Final report electronically signed by Dr. Salvador Leal on 10/31/2021 5:51 PM          LABS:   Labs Reviewed - No data to display    EMERGENCY DEPARTMENT COURSE:   Vitals:    Vitals:    10/31/21 1627   BP: (!) 144/88   Pulse: 72   Resp: 16   Temp: 98 °F (36.7 °C)   TempSrc: Oral   SpO2: 97%     8:28 PM EDT: The patient was seen and evaluated. MDM:  Patient is 80-year-old female with 2-day onset of right lower back and hip pain. Patient states it was atraumatic and insidious in onset. Pain is localized to the lower back and gluteal region. No sciatica or radicular pain noted. Patient does have history of mild to moderate right hip arthritis. X-rays of the lumbar and hip demonstrate arthritic changes more so in the L5-S1 region and right hip joint.   Discussed with patient the believe the pain is more muscular in nature and today we will give patient anti-inflammatory, muscle relaxer, and lidocaine patch to help with the acute pain. Will prescribe anti-inflammatory and muscle relaxer and sent to pharmacy. Recommended gentle range of motion exercises and stretching for lower back and hip. Patient will follow up with PCP or back specialist in the next 5 to 7 days if no noticeable improvement for recommendation for physical therapy. Patient understands and agrees to treatment. CRITICAL CARE:   None    CONSULTS:  None    PROCEDURES:  None    FINAL IMPRESSION      1. Strain of lumbar region, initial encounter    2.  Arthritis of right hip          DISPOSITION/PLAN   Discharged home    PATIENT REFERRED TO:  MICK Moran - CNP  [address] [place]  [phone]    Schedule an appointment as soon as possible for a visit in 5 days      [address]  [phone]  In 5 days  As needed      DISCHARGEMEDICATIONS:  Discharge Medication List as of 10/31/2021  6:29 PM      START taking these medications    Details   cyclobenzaprine (FLEXERIL) 10 MG tablet Take 1 tablet by mouth 3 times daily as needed for Muscle spasms, Disp-21 tablet, R-0Normal      naproxen (NAPROSYN) 500 MG tablet Take 1 tablet by mouth 2 times daily as needed for Pain, Disp-30 tablet, R-0Normal                 (Please note that portions of this note were completed with a voice recognition program.  Efforts were made to edit the dictations but occasionallywords are mis-transcribed.)      Leora Whalen PA-C(electronically signed)  Physician Associate, Emergency Department       Leora Whalen PA-C  10/31/21 2036

## 2021-11-11 ENCOUNTER — OFFICE VISIT (OUTPATIENT)
Dept: FAMILY MEDICINE CLINIC | Age: 56
End: 2021-11-11
Payer: COMMERCIAL

## 2021-11-11 VITALS
HEIGHT: 63 IN | DIASTOLIC BLOOD PRESSURE: 88 MMHG | HEART RATE: 83 BPM | WEIGHT: 243.5 LBS | OXYGEN SATURATION: 97 % | SYSTOLIC BLOOD PRESSURE: 138 MMHG | TEMPERATURE: 98 F | RESPIRATION RATE: 16 BRPM | BODY MASS INDEX: 43.14 KG/M2

## 2021-11-11 DIAGNOSIS — E78.00 PURE HYPERCHOLESTEROLEMIA: ICD-10-CM

## 2021-11-11 DIAGNOSIS — F17.210 CIGARETTE NICOTINE DEPENDENCE WITHOUT COMPLICATION: ICD-10-CM

## 2021-11-11 DIAGNOSIS — F33.1 MODERATE EPISODE OF RECURRENT MAJOR DEPRESSIVE DISORDER (HCC): ICD-10-CM

## 2021-11-11 DIAGNOSIS — B00.1 RECURRENT COLD SORES: Primary | ICD-10-CM

## 2021-11-11 DIAGNOSIS — E66.01 CLASS 3 SEVERE OBESITY DUE TO EXCESS CALORIES WITH SERIOUS COMORBIDITY AND BODY MASS INDEX (BMI) OF 40.0 TO 44.9 IN ADULT (HCC): ICD-10-CM

## 2021-11-11 DIAGNOSIS — F32.5 MAJOR DEPRESSIVE DISORDER WITH SINGLE EPISODE, IN FULL REMISSION (HCC): ICD-10-CM

## 2021-11-11 DIAGNOSIS — K21.9 GASTROESOPHAGEAL REFLUX DISEASE, UNSPECIFIED WHETHER ESOPHAGITIS PRESENT: ICD-10-CM

## 2021-11-11 DIAGNOSIS — M10.071 IDIOPATHIC GOUT INVOLVING TOE OF RIGHT FOOT, UNSPECIFIED CHRONICITY: ICD-10-CM

## 2021-11-11 PROCEDURE — 99406 BEHAV CHNG SMOKING 3-10 MIN: CPT | Performed by: NURSE PRACTITIONER

## 2021-11-11 PROCEDURE — 99214 OFFICE O/P EST MOD 30 MIN: CPT | Performed by: NURSE PRACTITIONER

## 2021-11-11 RX ORDER — ACYCLOVIR 400 MG/1
400 TABLET ORAL 2 TIMES DAILY
Qty: 180 TABLET | Refills: 1 | Status: SHIPPED | OUTPATIENT
Start: 2021-11-11

## 2021-11-11 RX ORDER — BUPROPION HYDROCHLORIDE 150 MG/1
150 TABLET ORAL EVERY MORNING
Qty: 30 TABLET | Refills: 3 | Status: SHIPPED | OUTPATIENT
Start: 2021-11-11 | End: 2022-02-21 | Stop reason: SDUPTHER

## 2021-11-11 RX ORDER — NAPROXEN 500 MG/1
500 TABLET ORAL 2 TIMES DAILY PRN
Qty: 60 TABLET | Refills: 0 | Status: SHIPPED | OUTPATIENT
Start: 2021-11-11

## 2021-11-11 RX ORDER — OMEPRAZOLE 20 MG/1
20 CAPSULE, DELAYED RELEASE ORAL DAILY
Qty: 90 CAPSULE | Refills: 3 | Status: SHIPPED | OUTPATIENT
Start: 2021-11-11 | End: 2022-06-22 | Stop reason: SDUPTHER

## 2021-11-11 RX ORDER — ATORVASTATIN CALCIUM 80 MG/1
80 TABLET, FILM COATED ORAL DAILY
Qty: 90 TABLET | Refills: 3 | Status: SHIPPED | OUTPATIENT
Start: 2021-11-11 | End: 2022-07-11 | Stop reason: SDUPTHER

## 2021-11-11 RX ORDER — CITALOPRAM 40 MG/1
40 TABLET ORAL DAILY
Qty: 90 TABLET | Refills: 3 | Status: SHIPPED | OUTPATIENT
Start: 2021-11-11 | End: 2021-12-16 | Stop reason: ALTCHOICE

## 2021-11-11 NOTE — PROGRESS NOTES
Chief Complaint   Patient presents with    Medication Refill     pt wsould like to discuss meds       History obtained from chart review and the patient. SUBJECTIVE:  Zahira Sun is a 64 y.o. female that presents today for follow up for chronic conditions     She has been getting cold sores, has been getting cold sores almost once a month. Would like medication to help prevent. Gout  Has been getting gout twice recently. She had to the  twice for it, treated with NSAIDS. Pain and swelling of right big toe. Tobacco Dependence    Currently smokes 0.5 ppd, has smoked for 35 years  Attempts to quit in the past? Yes  Interventions that have helped with previous cessation attempts: Wellbutrin, patches  Motivation to quit (scale of 1-10):  0/10    MDD  Moods have been poor. Increased feelings of depression, poor motivation, poor energy, tendency to comfort eat. Increased crying. Gained weight. She is taking Celexa, but not taking Wellbutrin. Dyslipidemia    Current Medication regimen - Lipitor 80 mg  Tolerating medications well? Yes  Personal History of CAD? No   Hx of CAD in first degree relatives? No  Current smoker? Yes  History of HTN? No  History of DM? No  Goal LDL - < 100    Shortness of breath or chest pain? No  Neurologic changes? No  Extremity edema?  No    Lab Results   Component Value Date    LDLCALC 54 11/24/2020     BP Readings from Last 3 Encounters:   11/11/21 138/88   10/31/21 (!) 144/88   10/01/20 138/78     Wt Readings from Last 3 Encounters:   11/11/21 243 lb 8 oz (110.5 kg)   10/01/20 235 lb (106.6 kg)   06/26/20 236 lb 12.8 oz (107.4 kg)     Age/Gender Health Maintenance    Lipid   Lab Results   Component Value Date    CHOL 122 01/06/2018    CHOL 230 (H) 09/02/2017    CHOL 215 (H) 05/20/2017     Lab Results   Component Value Date    TRIG 112 01/06/2018    TRIG 179 09/02/2017    TRIG 155 05/20/2017     Lab Results   Component Value Date    HDL 48 11/24/2020    HDL 42 05/25/2019    HDL 47 01/06/2018     Lab Results   Component Value Date    LDLCALC 54 11/24/2020    LDLCALC 47 05/25/2019    LDLCALC 53 01/06/2018     DM Screen  Lab Results   Component Value Date    LABA1C 5.5 05/25/2019     Colon Cancer Screening - had 6-7 years Dr. Marcella Morales (Age 54 to [de-identified] with 27 pack year hx, current smoker or quit within past 15 years) - n/a    Tetanus - needs  Influenza Vaccine - needs  Pneumonia Vaccine - 65  Zostavax - 50  HPV Vaccine - n/a    Breast Cancer Screening - mammogram 6/6/19  Cervical Cancer Screening - UTD  Osteoporosis Screening - 72    AAA Screening - n/a    Falls screening - n/a    Current Outpatient Medications   Medication Sig Dispense Refill    acyclovir (ZOVIRAX) 400 MG tablet Take 1 tablet by mouth 2 times daily 180 tablet 1    buPROPion (WELLBUTRIN XL) 150 MG extended release tablet Take 1 tablet by mouth every morning 30 tablet 3    albuterol sulfate HFA (PROAIR HFA) 108 (90 Base) MCG/ACT inhaler Inhale 2 puffs into the lungs every 6 hours as needed for Wheezing 1 Inhaler 3    naproxen (NAPROSYN) 500 MG tablet Take 1 tablet by mouth 2 times daily as needed for Pain 60 tablet 0    atorvastatin (LIPITOR) 80 MG tablet Take 1 tablet by mouth daily 90 tablet 3    omeprazole (PRILOSEC) 20 MG delayed release capsule Take 1 capsule by mouth Daily 90 capsule 3    citalopram (CELEXA) 40 MG tablet Take 1 tablet by mouth daily 90 tablet 3     No current facility-administered medications for this visit. Orders Placed This Encounter   Medications    acyclovir (ZOVIRAX) 400 MG tablet     Sig: Take 1 tablet by mouth 2 times daily     Dispense:  180 tablet     Refill:  1    buPROPion (WELLBUTRIN XL) 150 MG extended release tablet     Sig: Take 1 tablet by mouth every morning     Dispense:  30 tablet     Refill:  3         All medications reviewed and reconciled, including OTC and herbal medications. Updated list given to patient.        Patient Active Problem List   Diagnosis    Depression    Hyperlipidemia    [facility]    Class 3 severe obesity due to excess calories with serious comorbidity and body mass index (BMI) of 40.0 to 44.9 in adult [facility])    Left hip impingement syndrome    Arthritis of left hip    Cigarette nicotine dependence without complication    Gastroesophageal reflux disease    Recurrent cold sores    Idiopathic gout involving toe of right foot       Past Medical History:   Diagnosis Date    COPD, mild ([address] 2/15/2018    Depression     Hyperlipidemia     Hypertension        Past Surgical History:   Procedure Laterality Date    ENDOMETRIAL ABLATION      OVARY REMOVAL      left    TONSILLECTOMY         Allergies   Allergen Reactions    Flagyl [Metronidazole] Nausea And Vomiting       Social History     Socioeconomic History    Marital status:      Spouse name: Not on file    Number of children: Not on file    Years of education: Not on file    Highest education level: Not on file   Occupational History    Not on file   Tobacco Use    Smoking status: Current Every Day Smoker     Packs/day: 0.50     Years: 35.00     Pack years: 17.50    Smokeless tobacco: Never Used   Substance and Sexual Activity    Alcohol use: Yes    Drug use: No    Sexual activity: Not on file   Other Topics Concern    Not on file   Social History Narrative    Not on file     Social Determinants of Health     Financial Resource Strain:     Difficulty of Paying Living Expenses: Not on file   Food Insecurity:     Worried About Running Out of Food in the Last Year: Not on file    Denae of Food in the Last Year: Not on file   Transportation Needs:     Lack of Transportation (Medical): Not on file    Lack of Transportation (Non-Medical):  Not on file   Physical Activity:     Days of Exercise per Week: Not on file    Minutes of Exercise per Session: Not on file   Stress:     Feeling of Stress : Not on file   Social Connections:     Frequency of Communication with Friends and Family: Not on file    Frequency of Social Gatherings with Friends and Family: Not on file    Attends Anglican Services: Not on file    Active Member of Clubs or Organizations: Not on file    Attends Club or Organization Meetings: Not on file    Marital Status: Not on file   Intimate Partner Violence:     Fear of Current or Ex-Partner: Not on file    Emotionally Abused: Not on file    Physically Abused: Not on file    Sexually Abused: Not on file   Housing Stability:     Unable to Pay for Housing in the Last Year: Not on file    Number of [place] in the Last Year: Not on file    Unstable Housing in the Last Year: Not on file       No family history on file. I have reviewed the patient's past medical history, past surgical history, allergies, medications, social and family history and I have made updates where appropriate.       Review of Systems  Positive responses are highlighted in bold    Constitutional:  Fever, Chills, Night Sweats, Fatigue, Unexpected changes in weight  Eyes:  Eye discharge, Eye pain, Eye redness, Visual disturbances   HENT:  Ear pain, Tinnitus, Nosebleeds, Trouble swallowing, Hearing loss, Sore throat  Cardiovascular:  Chest Pain, Palpitations, Orthopnea, Paroxysmal Nocturnal Dyspnea  Respiratory:  Cough, Wheezing, Shortness of breath, Chest tightness, Apnea  Gastrointestinal:  Nausea, Vomiting, Diarrhea, Constipation, Heartburn, Blood in stool  Genitourinary:  Difficulty or painful urination, Flank pain, Change in frequency, Urgency  Skin:  Color change, Rash, Itching, Wound  Psychiatric:  Hallucinations, Anxiety, Depression, Suicidal ideation  Hematological:  Enlarged glands, Easy bleeding, Easily bruising  Musculoskeletal:  Joint pain, Back pain, Gait problems, Joint swelling, Myalgias  Neurological:  Dizziness, Headaches, Presyncope, Numbness, Seizures, Tremors  Allergy:  Environmental allergies, Food allergies  Endocrine: Heat Intolerance, Cold Intolerance, Polydipsia, Polyphagia, Polyuria    Lab Results   Component Value Date     11/24/2020    K 4.4 11/24/2020     11/24/2020    CO2 27 11/24/2020    BUN 12 11/24/2020    CREATININE 0.7 11/24/2020    GLUCOSE 106 11/24/2020    CALCIUM 9.9 11/24/2020    PROT 6.7 11/24/2020    LABALBU 4.3 11/24/2020    BILITOT 0.7 11/24/2020    ALKPHOS 95 11/24/2020    AST 17 11/24/2020    ALT 17 11/24/2020    LABGLOM 87 (A) 11/24/2020     Lab Results   Component Value Date    WBC 6.7 05/25/2019    HGB 15.0 05/25/2019    HCT 45.4 05/25/2019    MCV 97.2 05/25/2019     05/25/2019     Lab Results   Component Value Date    TSH 2.050 05/25/2019         PHYSICAL EXAM:  Vitals:    11/11/21 0754   BP: 138/88   Pulse: 83   Resp: 16   Temp: 98 °F (36.7 °C)   TempSrc: Oral   SpO2: 97%   Weight: 243 lb 8 oz (110.5 kg)   Height: 5' 3\" (1.6 m)     Body mass index is 43.13 kg/m². VS Reviewed  General Appearance: A&O x 3, No acute distress,well developed and well- nourished  Head: normocephalic and atraumatic  Eyes: pupils equal, round, and reactive to light, extraocular eye movements intact, conjunctivae and eye lids without erythema  Neck: supple and non-tender without mass, no thyromegaly or thyroid nodules, no cervical lymphadenopathy  Pulmonary/Chest: clear to diminished t/o, no w/r/r  Cardiovascular: S1 and S2 auscultated w/ RRR. No murmurs, rubs, clicks, or gallops, distal pulses intact. Abdomen: soft, non-tender, non-distended, bowl sounds physiologic,  no rebound or guarding, no masses or hernias noted. Liver and spleen without enlargement. Extremities: no cyanosis, clubbing or edema of the lower extremities  Musculoskeletal: No joint swelling or gross deformity   Neuro:  Alert, 5/5 strength globally and symmetrically  Psych: Affect appropriate. Mood normal. Thought process is normal without evidence of depression or psychosis. Good insight and appropriate interaction.   Cognition and memory appear to be intact. Skin: warm and dry, no rash  Lymph:  No cervical, auricular or supraclavicular lymph nodes palpated      ASSESSMENT & PLAN  Karla Mireles was seen today for medication refill. Diagnoses and all orders for this visit:    Recurrent cold sores  -     acyclovir (ZOVIRAX) 400 MG tablet; Take 1 tablet by mouth 2 times daily    Pure hypercholesterolemia  -     Comprehensive Metabolic Panel; Future  -     Lipid, Fasting; Future    Idiopathic gout involving toe of right foot, unspecified chronicity  -     Uric Acid; Future    Cigarette nicotine dependence without complication  -     MD TOBACCO USE CESSATION INTERMEDIATE 3-10 MINUTES    Class 3 severe obesity due to excess calories with serious comorbidity and body mass index (BMI) of 40.0 to 44.9 in adult (HCC)    Moderate episode of recurrent major depressive disorder (HCC)  -     buPROPion (WELLBUTRIN XL) 150 MG extended release tablet; Take 1 tablet by mouth every morning        - medication refills  - annual labs  - check uric acid level, consider adding Allopuinol  - not interested in smoking cessation at this time  - is going to check into local weight management program  - add Acyclovir for prevention of cold sores  - mammogram order given to patient, she will schedule at [facility] in [address]    Return in about 4 weeks (around 12/9/2021) for MDD. Karla Mireles released without restrictions. PATIENT COUNSELING    Counseling was provided today regarding the following topics: Healthy eating habits, Regular exercise, substance abuse and healthy sleep habits. Karla Mireles received counseling on the following healthy behaviors: medication adherence    Patient given educational materials on: See Attached    I have instructed Karla Mireles to complete a self tracking handout on none and instructed them to bring it with them to her next appointment.      Barriers to learning and self management: none    Discussed use, benefit, and side effects of prescribed medications. Barriers to medication compliance addressed. All patient questions answered. Pt voiced understanding.        Electronically signed by MICK Winn CNP on 11/11/2021 at 8:15 AM

## 2021-11-12 ENCOUNTER — PATIENT MESSAGE (OUTPATIENT)
Dept: FAMILY MEDICINE CLINIC | Age: 56
End: 2021-11-12

## 2021-11-12 DIAGNOSIS — F17.210 CIGARETTE NICOTINE DEPENDENCE WITHOUT COMPLICATION: ICD-10-CM

## 2021-11-12 DIAGNOSIS — F32.5 MAJOR DEPRESSIVE DISORDER WITH SINGLE EPISODE, IN FULL REMISSION (HCC): ICD-10-CM

## 2021-11-12 RX ORDER — BUPROPION HYDROCHLORIDE 300 MG/1
300 TABLET ORAL EVERY MORNING
Qty: 90 TABLET | Refills: 3 | Status: SHIPPED | OUTPATIENT
Start: 2021-11-12 | End: 2022-02-21 | Stop reason: SDUPTHER

## 2021-11-12 NOTE — TELEPHONE ENCOUNTER
From: Loco Eden  To: Hannah Fernandes  Sent: 11/12/2021 10:05 AM EST  Subject: Wellbutrin    Good morning,    I have been taking BUPROPION HCL  MG. That is the prescription I could not remember yesterday. Is this the Wellbutrin? If so, can you give me anything else to help with moods?     Thanks

## 2021-12-04 ENCOUNTER — HOSPITAL ENCOUNTER (OUTPATIENT)
Age: 56
Discharge: HOME | End: 2021-12-04
Payer: COMMERCIAL

## 2021-12-04 DIAGNOSIS — E78.00 PURE HYPERCHOLESTEROLEMIA: ICD-10-CM

## 2021-12-04 DIAGNOSIS — M10.071 IDIOPATHIC GOUT INVOLVING TOE OF RIGHT FOOT, UNSPECIFIED CHRONICITY: ICD-10-CM

## 2021-12-04 LAB
ALBUMIN SERPL-MCNC: 4.7 G/DL (ref 3.5–5.1)
ALP BLD-CCNC: 116 U/L (ref 38–126)
ALT SERPL-CCNC: 22 U/L (ref 11–66)
ANION GAP SERPL CALCULATED.3IONS-SCNC: 9 MEQ/L (ref 8–16)
AST SERPL-CCNC: 22 U/L (ref 5–40)
BILIRUB SERPL-MCNC: 0.8 MG/DL (ref 0.3–1.2)
BUN BLDV-MCNC: 9 MG/DL (ref 7–22)
CALCIUM SERPL-MCNC: 9.7 MG/DL (ref 8.5–10.5)
CHLORIDE BLD-SCNC: 109 MEQ/L (ref 98–111)
CHOLESTEROL, FASTING: 140 MG/DL (ref 100–199)
CO2: 29 MEQ/L (ref 23–33)
CREAT SERPL-MCNC: 0.8 MG/DL (ref 0.4–1.2)
GFR SERPL CREATININE-BSD FRML MDRD: 74 ML/MIN/1.73M2
GLUCOSE BLD-MCNC: 111 MG/DL (ref 70–108)
HDLC SERPL-MCNC: 50 MG/DL
LDL CHOLESTEROL CALCULATED: 58 MG/DL
POTASSIUM SERPL-SCNC: 4.5 MEQ/L (ref 3.5–5.2)
SODIUM BLD-SCNC: 147 MEQ/L (ref 135–145)
TOTAL PROTEIN: 6.8 G/DL (ref 6.1–8)
TRIGLYCERIDE, FASTING: 160 MG/DL (ref 0–199)
URIC ACID: 5.9 MG/DL (ref 2.4–5.7)

## 2021-12-04 PROCEDURE — 84550 ASSAY OF BLOOD/URIC ACID: CPT

## 2021-12-04 PROCEDURE — 80061 LIPID PANEL: CPT

## 2021-12-04 PROCEDURE — 36415 COLL VENOUS BLD VENIPUNCTURE: CPT

## 2021-12-04 PROCEDURE — 80053 COMPREHEN METABOLIC PANEL: CPT

## 2021-12-06 ENCOUNTER — TELEPHONE (OUTPATIENT)
Dept: FAMILY MEDICINE CLINIC | Age: 56
End: 2021-12-06

## 2021-12-06 DIAGNOSIS — E87.0 HYPERNATREMIA: Primary | ICD-10-CM

## 2021-12-06 NOTE — TELEPHONE ENCOUNTER
----- Message from MICK Wiseman Caro, CNP sent at 12/6/2021 11:30 AM EST -----  Let Shanel Notice know her labs are all stable and within appropriate ranges. His sodium level on the lab was elevated at 147. A high sodium generally means someone is dehydrated. At the time of the lab, was she having any N/V/D? Was she eating and drinking normally? I would like to just recheck her sodium level in a couple weeks to see if it's consistent or if it was just a one time fluke. Order placed.

## 2021-12-11 ENCOUNTER — HOSPITAL ENCOUNTER (OUTPATIENT)
Age: 56
Discharge: HOME | End: 2021-12-11
Payer: COMMERCIAL

## 2021-12-11 DIAGNOSIS — E87.0 HYPERNATREMIA: ICD-10-CM

## 2021-12-11 LAB — SODIUM BLD-SCNC: 142 MEQ/L (ref 135–145)

## 2021-12-11 PROCEDURE — 36415 COLL VENOUS BLD VENIPUNCTURE: CPT

## 2021-12-11 PROCEDURE — 84295 ASSAY OF SERUM SODIUM: CPT

## 2021-12-13 ENCOUNTER — TELEPHONE (OUTPATIENT)
Dept: FAMILY MEDICINE CLINIC | Age: 56
End: 2021-12-13

## 2021-12-13 NOTE — TELEPHONE ENCOUNTER
----- Message from MICK Sr - CNP sent at 12/13/2021  9:42 AM EST -----  Let Khushboo Ferreira know her Na level was normal.

## 2021-12-16 ENCOUNTER — OFFICE VISIT (OUTPATIENT)
Dept: FAMILY MEDICINE CLINIC | Age: 56
End: 2021-12-16
Payer: COMMERCIAL

## 2021-12-16 VITALS
HEIGHT: 63 IN | WEIGHT: 243.5 LBS | SYSTOLIC BLOOD PRESSURE: 138 MMHG | OXYGEN SATURATION: 97 % | RESPIRATION RATE: 16 BRPM | DIASTOLIC BLOOD PRESSURE: 88 MMHG | HEART RATE: 72 BPM | TEMPERATURE: 97.3 F | BODY MASS INDEX: 43.14 KG/M2

## 2021-12-16 DIAGNOSIS — F33.1 MODERATE EPISODE OF RECURRENT MAJOR DEPRESSIVE DISORDER (HCC): Primary | ICD-10-CM

## 2021-12-16 DIAGNOSIS — J34.89 NOSE PAIN: ICD-10-CM

## 2021-12-16 PROCEDURE — 99214 OFFICE O/P EST MOD 30 MIN: CPT | Performed by: NURSE PRACTITIONER

## 2021-12-16 RX ORDER — FLUOXETINE HYDROCHLORIDE 20 MG/1
20 CAPSULE ORAL DAILY
Qty: 30 CAPSULE | Refills: 1 | Status: SHIPPED | OUTPATIENT
Start: 2021-12-16 | End: 2022-01-27

## 2021-12-16 NOTE — PROGRESS NOTES
Chief Complaint   Patient presents with    Follow-up     4 week, MDD pt states things have improved a little     Other     pt states the right side of her nose is tender x3 weeks        History obtained from chart review and the patient. SUBJECTIVE:  Juve Norwood is a 64 y.o. female that presents today for follow up for chronic conditions     MDD  Moods are a little improved since increasing Wellbutrin, but still feeling depressed daily. Severity of it is improved though. Not as tearful. Energy and motivation are low. Denies any SI/HI. Anxiety is fine. Also noticed the left side of her nose has been sore for a couple weeks. Tender to touch, no redness. She hasn't tried anything. Denies any bloody noses, no nasal drainage.     Age/Gender Health Maintenance    Lipid   Lab Results   Component Value Date    CHOL 122 01/06/2018    CHOL 230 (H) 09/02/2017    CHOL 215 (H) 05/20/2017     Lab Results   Component Value Date    TRIG 112 01/06/2018    TRIG 179 09/02/2017    TRIG 155 05/20/2017     Lab Results   Component Value Date    HDL 50 12/04/2021    HDL 48 11/24/2020    HDL 42 05/25/2019     Lab Results   Component Value Date    LDLCALC 58 12/04/2021    LDLCALC 54 11/24/2020    LDLCALC 47 05/25/2019     DM Screen  Lab Results   Component Value Date    LABA1C 5.5 05/25/2019     Colon Cancer Screening - had 6-7 years Dr. Jesus Oakley (Age 54 to [de-identified] with 30 pack year hx, current smoker or quit within past 15 years) - n/a    Tetanus - needs  Influenza Vaccine - needs  Pneumonia Vaccine - 65  Zostavax - 50  HPV Vaccine - n/a    Breast Cancer Screening - mammogram 6/6/19  Cervical Cancer Screening - UTD  Osteoporosis Screening - 72    AAA Screening - n/a    Falls screening - n/a    Current Outpatient Medications   Medication Sig Dispense Refill    FLUoxetine (PROZAC) 20 MG capsule Take 1 capsule by mouth daily 30 capsule 1    mupirocin (BACTROBAN NASAL) 2 % nasal ointment Take by Nasal route 2 times daily for 10 days. 1 g 3    buPROPion (WELLBUTRIN XL) 300 MG extended release tablet Take 1 tablet by mouth every morning (take along with 150 mg Wellbutrin for total dose of 450 mg) 90 tablet 3    naproxen (NAPROSYN) 500 MG tablet Take 1 tablet by mouth 2 times daily as needed for Pain 60 tablet 0    atorvastatin (LIPITOR) 80 MG tablet Take 1 tablet by mouth daily 90 tablet 3    omeprazole (PRILOSEC) 20 MG delayed release capsule Take 1 capsule by mouth Daily 90 capsule 3    acyclovir (ZOVIRAX) 400 MG tablet Take 1 tablet by mouth 2 times daily 180 tablet 1    buPROPion (WELLBUTRIN XL) 150 MG extended release tablet Take 1 tablet by mouth every morning 30 tablet 3    albuterol sulfate HFA (PROAIR HFA) 108 (90 Base) MCG/ACT inhaler Inhale 2 puffs into the lungs every 6 hours as needed for Wheezing 1 Inhaler 3     No current facility-administered medications for this visit. Orders Placed This Encounter   Medications    FLUoxetine (PROZAC) 20 MG capsule     Sig: Take 1 capsule by mouth daily     Dispense:  30 capsule     Refill:  1    mupirocin (BACTROBAN NASAL) 2 % nasal ointment     Sig: Take by Nasal route 2 times daily for 10 days. Dispense:  1 g     Refill:  3         All medications reviewed and reconciled, including OTC and herbal medications. Updated list given to patient.        Patient Active Problem List   Diagnosis    Depression    Hyperlipidemia    COPD, mild ([facility])    Class 3 severe obesity due to excess calories with serious comorbidity and body mass index (BMI) of 40.0 to 44.9 in adult [facility])    Left hip impingement syndrome    Arthritis of left hip    Cigarette nicotine dependence without complication    Gastroesophageal reflux disease    Recurrent cold sores    Idiopathic gout involving toe of right foot       Past Medical History:   Diagnosis Date    COPD, mild ([facility] [address] 2/15/2018    Depression     Hyperlipidemia     Hypertension        Past Surgical History: Procedure Laterality Date    ENDOMETRIAL ABLATION      OVARY REMOVAL      left    TONSILLECTOMY         Allergies   Allergen Reactions    Flagyl [Metronidazole] Nausea And Vomiting       Social History     Socioeconomic History    Marital status:      Spouse name: Not on file    Number of children: Not on file    Years of education: Not on file    Highest education level: Not on file   Occupational History    Not on file   Tobacco Use    Smoking status: Current Every Day Smoker     Packs/day: 0.50     Years: 35.00     Pack years: 17.50    Smokeless tobacco: Never Used   Substance and Sexual Activity    Alcohol use: Yes    Drug use: No    Sexual activity: Not on file   Other Topics Concern    Not on file   Social History Narrative    Not on file     Social Determinants of Health     Financial Resource Strain:     Difficulty of Paying Living Expenses: Not on file   Food Insecurity:     Worried About Running Out of Food in the Last Year: Not on file    Denae of Food in the Last Year: Not on file   Transportation Needs:     Lack of Transportation (Medical): Not on file    Lack of Transportation (Non-Medical):  Not on file   Physical Activity:     Days of Exercise per Week: Not on file    Minutes of Exercise per Session: Not on file   Stress:     Feeling of Stress : Not on file   Social Connections:     Frequency of Communication with Friends and Family: Not on file    Frequency of Social Gatherings with Friends and Family: Not on file    Attends Mandaeism Services: Not on file    Active Member of Clubs or Organizations: Not on file    Attends Club or Organization Meetings: Not on file    Marital Status: Not on file   Intimate Partner Violence:     Fear of Current or Ex-Partner: Not on file    Emotionally Abused: Not on file    Physically Abused: Not on file    Sexually Abused: Not on file   Housing Stability:     Unable to Pay for Housing in the Last Year: Not on file    Number of Places Lived in the Last Year: Not on file    Unstable Housing in the Last Year: Not on file       No family history on file. I have reviewed the patient's past medical history, past surgical history, allergies, medications, social and family history and I have made updates where appropriate.       Review of Systems  Positive responses are highlighted in bold    Constitutional:  Fever, Chills, Night Sweats, Fatigue, Unexpected changes in weight  Eyes:  Eye discharge, Eye pain, Eye redness, Visual disturbances   HENT:  Ear pain, Tinnitus, Nosebleeds, Trouble swallowing, Hearing loss, Sore throat  Cardiovascular:  Chest Pain, Palpitations, Orthopnea, Paroxysmal Nocturnal Dyspnea  Respiratory:  Cough, Wheezing, Shortness of breath, Chest tightness, Apnea  Gastrointestinal:  Nausea, Vomiting, Diarrhea, Constipation, Heartburn, Blood in stool  Genitourinary:  Difficulty or painful urination, Flank pain, Change in frequency, Urgency  Skin:  Color change, Rash, Itching, Wound  Psychiatric:  Hallucinations, Anxiety, Depression, Suicidal ideation  Hematological:  Enlarged glands, Easy bleeding, Easily bruising  Musculoskeletal:  Joint pain, Back pain, Gait problems, Joint swelling, Myalgias  Neurological:  Dizziness, Headaches, Presyncope, Numbness, Seizures, Tremors  Allergy:  Environmental allergies, Food allergies  Endocrine:  Heat Intolerance, Cold Intolerance, Polydipsia, Polyphagia, Polyuria    Lab Results   Component Value Date     12/11/2021    K 4.5 12/04/2021     12/04/2021    CO2 29 12/04/2021    BUN 9 12/04/2021    CREATININE 0.8 12/04/2021    GLUCOSE 111 (H) 12/04/2021    CALCIUM 9.7 12/04/2021    PROT 6.8 12/04/2021    LABALBU 4.7 12/04/2021    BILITOT 0.8 12/04/2021    ALKPHOS 116 12/04/2021    AST 22 12/04/2021    ALT 22 12/04/2021    LABGLOM 74 (A) 12/04/2021     Lab Results   Component Value Date    WBC 6.7 05/25/2019    HGB 15.0 05/25/2019    HCT 45.4 05/25/2019    MCV 97.2 05/25/2019     05/25/2019     Lab Results   Component Value Date    TSH 2.050 05/25/2019         PHYSICAL EXAM:  Vitals:    12/16/21 0752   BP: 138/88   Pulse: 72   Resp: 16   Temp: 97.3 °F (36.3 °C)   TempSrc: Oral   SpO2: 97%   Weight: 243 lb 8 oz (110.5 kg)   Height: 5' 3\" (1.6 m)     Body mass index is 43.13 kg/m². VS Reviewed  General Appearance: A&O x 3, No acute distress,well developed and well- nourished  Head: normocephalic and atraumatic  Eyes: pupils equal, round, and reactive to light, extraocular eye movements intact, conjunctivae and eye lids without erythema  ENT: + pain over nasal bone no redness, swelling or external abnormality noted, right nasal turbinate normal, left nasal turbinate mildly swollen and red  Neck: supple and non-tender without mass, no thyromegaly or thyroid nodules, no cervical lymphadenopathy  Pulmonary/Chest: clear to diminished t/o, no w/r/r  Cardiovascular: S1 and S2 auscultated w/ RRR. No murmurs, rubs, clicks, or gallops, distal pulses intact. Abdomen: soft, non-tender, non-distended, bowl sounds physiologic,  no rebound or guarding, no masses or hernias noted. Liver and spleen without enlargement. Extremities: no cyanosis, clubbing or edema of the lower extremities  Musculoskeletal: No joint swelling or gross deformity   Neuro:  Alert, 5/5 strength globally and symmetrically  Psych: Affect appropriate. Mood normal. Thought process is normal without evidence of depression or psychosis. Good insight and appropriate interaction. Cognition and memory appear to be intact. Skin: warm and dry, no rash  Lymph:  No cervical, auricular or supraclavicular lymph nodes palpated      ASSESSMENT & PLAN  Khushboo Ferreira was seen today for follow-up and other. Diagnoses and all orders for this visit:    Moderate episode of recurrent major depressive disorder (HCC)  -     FLUoxetine (PROZAC) 20 MG capsule;  Take 1 capsule by mouth daily    Nose pain  -     XR NASAL BONE (MIN 3

## 2021-12-21 ENCOUNTER — TELEPHONE (OUTPATIENT)
Dept: FAMILY MEDICINE CLINIC | Age: 56
End: 2021-12-21

## 2021-12-21 NOTE — TELEPHONE ENCOUNTER
----- Message from MICK Mcmahan CNP sent at 12/21/2021  9:33 AM EST -----  Let Lucho Hurtado know her mammogram was normal.

## 2022-01-27 ENCOUNTER — OFFICE VISIT (OUTPATIENT)
Dept: FAMILY MEDICINE CLINIC | Age: 57
End: 2022-01-27
Payer: COMMERCIAL

## 2022-01-27 VITALS
HEIGHT: 63 IN | TEMPERATURE: 98 F | WEIGHT: 242.8 LBS | SYSTOLIC BLOOD PRESSURE: 156 MMHG | BODY MASS INDEX: 43.02 KG/M2 | RESPIRATION RATE: 14 BRPM | OXYGEN SATURATION: 98 % | DIASTOLIC BLOOD PRESSURE: 98 MMHG | HEART RATE: 83 BPM

## 2022-01-27 DIAGNOSIS — F33.1 MODERATE EPISODE OF RECURRENT MAJOR DEPRESSIVE DISORDER (HCC): ICD-10-CM

## 2022-01-27 DIAGNOSIS — M79.671 ARCH PAIN OF RIGHT FOOT: Primary | ICD-10-CM

## 2022-01-27 DIAGNOSIS — I10 ESSENTIAL HYPERTENSION: ICD-10-CM

## 2022-01-27 PROCEDURE — 99214 OFFICE O/P EST MOD 30 MIN: CPT | Performed by: NURSE PRACTITIONER

## 2022-01-27 PROCEDURE — 93000 ELECTROCARDIOGRAM COMPLETE: CPT | Performed by: NURSE PRACTITIONER

## 2022-01-27 RX ORDER — LISINOPRIL 10 MG/1
10 TABLET ORAL DAILY
Qty: 30 TABLET | Refills: 2 | Status: SHIPPED | OUTPATIENT
Start: 2022-01-27 | End: 2022-02-10 | Stop reason: SDUPTHER

## 2022-01-27 RX ORDER — FLUOXETINE HYDROCHLORIDE 40 MG/1
40 CAPSULE ORAL DAILY
Qty: 90 CAPSULE | Refills: 0 | Status: SHIPPED | OUTPATIENT
Start: 2022-01-27 | End: 2022-02-08 | Stop reason: SDUPTHER

## 2022-01-27 ASSESSMENT — PATIENT HEALTH QUESTIONNAIRE - PHQ9
1. LITTLE INTEREST OR PLEASURE IN DOING THINGS: 1
2. FEELING DOWN, DEPRESSED OR HOPELESS: 1
SUM OF ALL RESPONSES TO PHQ QUESTIONS 1-9: 2
SUM OF ALL RESPONSES TO PHQ9 QUESTIONS 1 & 2: 2
SUM OF ALL RESPONSES TO PHQ QUESTIONS 1-9: 2

## 2022-01-27 NOTE — PROGRESS NOTES
Chief Complaint   Patient presents with    Follow-up     pt states that depression is about the same     Foot Pain     arch of right foot started a week ago        History obtained from chart review and the patient. SUBJECTIVE:  Orlando Monmira is a 64 y.o. female that presents today for follow up for depression    MDD  Moods are decent. Feeling depressed about 3-4 days/week. Energy and motivation are about the same as prior. Appetite is ok. Struggling some falling asleep, is up at least once a night. Denies any side effects from Prozac and Wellbutrin. C/o right foot pain through the arch. Painful to walk. She has tried stretches, rolling foot on ball which does help some.     BP Readings from Last 3 Encounters:   01/27/22 (!) 156/98   12/16/21 138/88   11/11/21 138/88       Age/Gender Health Maintenance    Lipid   Lab Results   Component Value Date    CHOL 122 01/06/2018    CHOL 230 (H) 09/02/2017    CHOL 215 (H) 05/20/2017     Lab Results   Component Value Date    TRIG 112 01/06/2018    TRIG 179 09/02/2017    TRIG 155 05/20/2017     Lab Results   Component Value Date    HDL 50 12/04/2021    HDL 48 11/24/2020    HDL 42 05/25/2019     Lab Results   Component Value Date    LDLCALC 58 12/04/2021    LDLCALC 54 11/24/2020    LDLCALC 47 05/25/2019     DM Screen  Lab Results   Component Value Date    LABA1C 5.5 05/25/2019     Colon Cancer Screening - had 6-7 years Dr. Orlin Hamm (Age 54 to [de-identified] with 30 pack year hx, current smoker or quit within past 15 years) - n/a    Tetanus - needs  Influenza Vaccine - needs  Pneumonia Vaccine - 65  Zostavax - 50  HPV Vaccine - n/a    Breast Cancer Screening - mammogram 6/6/19  Cervical Cancer Screening - UTD  Osteoporosis Screening - 72    AAA Screening - n/a    Falls screening - n/a    Current Outpatient Medications   Medication Sig Dispense Refill    FLUoxetine (PROZAC) 40 MG capsule Take 1 capsule by mouth daily 90 capsule 0    lisinopril (PRINIVIL;ZESTRIL) 10 MG tablet Take 1 tablet by mouth daily 30 tablet 2    mupirocin (BACTROBAN NASAL) 2 % nasal ointment Take by Nasal route 2 times daily for 10 days. 1 g 3    buPROPion (WELLBUTRIN XL) 300 MG extended release tablet Take 1 tablet by mouth every morning (take along with 150 mg Wellbutrin for total dose of 450 mg) 90 tablet 3    naproxen (NAPROSYN) 500 MG tablet Take 1 tablet by mouth 2 times daily as needed for Pain 60 tablet 0    atorvastatin (LIPITOR) 80 MG tablet Take 1 tablet by mouth daily 90 tablet 3    omeprazole (PRILOSEC) 20 MG delayed release capsule Take 1 capsule by mouth Daily 90 capsule 3    acyclovir (ZOVIRAX) 400 MG tablet Take 1 tablet by mouth 2 times daily 180 tablet 1    buPROPion (WELLBUTRIN XL) 150 MG extended release tablet Take 1 tablet by mouth every morning 30 tablet 3    albuterol sulfate HFA (PROAIR HFA) 108 (90 Base) MCG/ACT inhaler Inhale 2 puffs into the lungs every 6 hours as needed for Wheezing 1 Inhaler 3     No current facility-administered medications for this visit. Orders Placed This Encounter   Medications    FLUoxetine (PROZAC) 40 MG capsule     Sig: Take 1 capsule by mouth daily     Dispense:  90 capsule     Refill:  0    lisinopril (PRINIVIL;ZESTRIL) 10 MG tablet     Sig: Take 1 tablet by mouth daily     Dispense:  30 tablet     Refill:  2         All medications reviewed and reconciled, including OTC and herbal medications. Updated list given to patient.        Patient Active Problem List   Diagnosis    Depression    Essential hypertension    Hyperlipidemia    COPD, mild (HCC)    Class 3 severe obesity due to excess calories with serious comorbidity and body mass index (BMI) of 40.0 to 44.9 in adult [facility])    Left hip impingement syndrome    Arthritis of left hip    Cigarette nicotine dependence without complication    Gastroesophageal reflux disease    Recurrent cold sores    Idiopathic gout involving toe of right foot       Past Medical History:   Diagnosis Date    COPD, mild ([facility] [address] 2/15/2018    Depression     Hyperlipidemia     Hypertension        Past Surgical History:   Procedure Laterality Date    ENDOMETRIAL ABLATION      OVARY REMOVAL      left    TONSILLECTOMY         Allergies   Allergen Reactions    Flagyl [Metronidazole] Nausea And Vomiting       Social History     Socioeconomic History    Marital status:      Spouse name: Not on file    Number of children: Not on file    Years of education: Not on file    Highest education level: Not on file   Occupational History    Not on file   Tobacco Use    Smoking status: Current Every Day Smoker     Packs/day: 0.50     Years: 35.00     Pack years: 17.50    Smokeless tobacco: Never Used   Substance and Sexual Activity    Alcohol use: Yes    Drug use: No    Sexual activity: Not on file   Other Topics Concern    Not on file   Social History Narrative    Not on file     Social Determinants of Health     Financial Resource Strain:     Difficulty of Paying Living Expenses: Not on file   Food Insecurity:     Worried About Running Out of Food in the Last Year: Not on file    Denae of Food in the Last Year: Not on file   Transportation Needs:     Lack of Transportation (Medical): Not on file    Lack of Transportation (Non-Medical):  Not on file   Physical Activity:     Days of Exercise per Week: Not on file    Minutes of Exercise per Session: Not on file   Stress:     Feeling of Stress : Not on file   Social Connections:     Frequency of Communication with Friends and Family: Not on file    Frequency of Social Gatherings with Friends and Family: Not on file    Attends Mandaen Services: Not on file    Active Member of Clubs or Organizations: Not on file    Attends Club or Organization Meetings: Not on file    Marital Status: Not on file   Intimate Partner Violence:     Fear of Current or Ex-Partner: Not on file    Emotionally Abused: Not on file   [address] [place] [address] Physically Abused: Not on file    Sexually Abused: Not on file   Housing Stability:     Unable to Pay for Housing in the Last Year: Not on file    Number of Places Lived in the Last Year: Not on file    Unstable Housing in the Last Year: Not on file       History reviewed. No pertinent family history. I have reviewed the patient's past medical history, past surgical history, allergies, medications, social and family history and I have made updates where appropriate.       Review of Systems  Positive responses are highlighted in bold    Constitutional:  Fever, Chills, Night Sweats, Fatigue, Unexpected changes in weight  Eyes:  Eye discharge, Eye pain, Eye redness, Visual disturbances   HENT:  Ear pain, Tinnitus, Nosebleeds, Trouble swallowing, Hearing loss, Sore throat  Cardiovascular:  Chest Pain, Palpitations, Orthopnea, Paroxysmal Nocturnal Dyspnea  Respiratory:  Cough, Wheezing, Shortness of breath, Chest tightness, Apnea  Gastrointestinal:  Nausea, Vomiting, Diarrhea, Constipation, Heartburn, Blood in stool  Genitourinary:  Difficulty or painful urination, Flank pain, Change in frequency, Urgency  Skin:  Color change, Rash, Itching, Wound  Psychiatric:  Hallucinations, Anxiety, Depression, Suicidal ideation  Hematological:  Enlarged glands, Easy bleeding, Easily bruising  Musculoskeletal:  Joint pain, Back pain, Gait problems, Joint swelling, Myalgias  Neurological:  Dizziness, Headaches, Presyncope, Numbness, Seizures, Tremors  Allergy:  Environmental allergies, Food allergies  Endocrine:  Heat Intolerance, Cold Intolerance, Polydipsia, Polyphagia, Polyuria    Lab Results   Component Value Date     12/11/2021    K 4.5 12/04/2021     12/04/2021    CO2 29 12/04/2021    BUN 9 12/04/2021    CREATININE 0.8 12/04/2021    GLUCOSE 111 (H) 12/04/2021    CALCIUM 9.7 12/04/2021    PROT 6.8 12/04/2021    LABALBU 4.7 12/04/2021    BILITOT 0.8 12/04/2021    ALKPHOS 116 12/04/2021    AST 22 12/04/2021 Referral To Podiatry    Moderate episode of recurrent major depressive disorder (HCC)  -     FLUoxetine (PROZAC) 40 MG capsule; Take 1 capsule by mouth daily    Essential hypertension  -     lisinopril (PRINIVIL;ZESTRIL) 10 MG tablet; Take 1 tablet by mouth daily  -     EKG 12 Lead        - con't Wellbutrin  - increase Prozac to 40 mg  - refer to podiatry  - reviewed EKG, NSR without any ST segment changes  - prior Hx of HTN and has been on Lisinopril, will resume this    DISPOSITION    Return in about 2 months (around 3/27/2022) for MDD (OK for VV), 2 week nurse visit for BP recheck. Hugo Chowdhury released without restrictions. PATIENT COUNSELING    Counseling was provided today regarding the following topics: Healthy eating habits, Regular exercise, substance abuse and healthy sleep habits. Hugo Chowdhury received counseling on the following healthy behaviors: medication adherence    Patient given educational materials on: See Attached    I have instructed Hugo Chowdhury to complete a self tracking handout on none and instructed them to bring it with them to her next appointment. Barriers to learning and self management: none    Discussed use, benefit, and side effects of prescribed medications. Barriers to medication compliance addressed. All patient questions answered. Pt voiced understanding.        Electronically signed by MICK Strong CNP on 1/27/2022 at 8:24 AM

## 2022-02-08 ENCOUNTER — PATIENT MESSAGE (OUTPATIENT)
Dept: FAMILY MEDICINE CLINIC | Age: 57
End: 2022-02-08

## 2022-02-08 DIAGNOSIS — F33.1 MODERATE EPISODE OF RECURRENT MAJOR DEPRESSIVE DISORDER (HCC): ICD-10-CM

## 2022-02-08 RX ORDER — FLUOXETINE HYDROCHLORIDE 40 MG/1
40 CAPSULE ORAL DAILY
Qty: 30 CAPSULE | Refills: 0 | Status: SHIPPED | OUTPATIENT
Start: 2022-02-08 | End: 2022-02-08 | Stop reason: SDUPTHER

## 2022-02-08 RX ORDER — FLUOXETINE HYDROCHLORIDE 40 MG/1
40 CAPSULE ORAL DAILY
Qty: 90 CAPSULE | Refills: 1 | Status: SHIPPED | OUTPATIENT
Start: 2022-02-08 | End: 2022-04-14 | Stop reason: SDUPTHER

## 2022-02-10 ENCOUNTER — NURSE ONLY (OUTPATIENT)
Dept: FAMILY MEDICINE CLINIC | Age: 57
End: 2022-02-10

## 2022-02-10 ENCOUNTER — TELEPHONE (OUTPATIENT)
Dept: FAMILY MEDICINE CLINIC | Age: 57
End: 2022-02-10

## 2022-02-10 VITALS — DIASTOLIC BLOOD PRESSURE: 82 MMHG | SYSTOLIC BLOOD PRESSURE: 120 MMHG

## 2022-02-10 VITALS — DIASTOLIC BLOOD PRESSURE: 82 MMHG | OXYGEN SATURATION: 98 % | SYSTOLIC BLOOD PRESSURE: 120 MMHG | HEART RATE: 84 BPM

## 2022-02-10 DIAGNOSIS — I10 ESSENTIAL HYPERTENSION: Primary | ICD-10-CM

## 2022-02-10 DIAGNOSIS — I10 ESSENTIAL HYPERTENSION: ICD-10-CM

## 2022-02-10 RX ORDER — LISINOPRIL 10 MG/1
10 TABLET ORAL DAILY
Qty: 90 TABLET | Refills: 1 | Status: SHIPPED | OUTPATIENT
Start: 2022-02-10 | End: 2022-07-11 | Stop reason: SDUPTHER

## 2022-02-10 NOTE — TELEPHONE ENCOUNTER
BP Readings from Last 3 Encounters:   02/10/22 120/82   01/27/22 (!) 156/98   12/16/21 138/88       Pt presents for BP check after starting new medication. Today's reading 120/82.

## 2022-02-10 NOTE — PROGRESS NOTES
Pt presents for a BP check after starting new medication. Today's BP reading 120/82.      BP Readings from Last 3 Encounters:   02/10/22 120/82   01/27/22 (!) 156/98   12/16/21 138/88

## 2022-02-10 NOTE — TELEPHONE ENCOUNTER
Let Dayne Betters know her repeat BP looks great. Continue the Lisinopril 10 mg. I did send a 90 day of the Lisinopril to the [facility].  Follow up with me as scheduled 3/31

## 2022-02-21 DIAGNOSIS — F17.210 CIGARETTE NICOTINE DEPENDENCE WITHOUT COMPLICATION: ICD-10-CM

## 2022-02-21 DIAGNOSIS — F33.1 MODERATE EPISODE OF RECURRENT MAJOR DEPRESSIVE DISORDER (HCC): ICD-10-CM

## 2022-02-21 DIAGNOSIS — F32.5 MAJOR DEPRESSIVE DISORDER WITH SINGLE EPISODE, IN FULL REMISSION (HCC): ICD-10-CM

## 2022-02-21 RX ORDER — BUPROPION HYDROCHLORIDE 300 MG/1
300 TABLET ORAL EVERY MORNING
Qty: 90 TABLET | Refills: 3 | Status: SHIPPED | OUTPATIENT
Start: 2022-02-21 | End: 2022-07-11 | Stop reason: SDUPTHER

## 2022-02-21 RX ORDER — BUPROPION HYDROCHLORIDE 150 MG/1
150 TABLET ORAL EVERY MORNING
Qty: 90 TABLET | Refills: 3 | Status: SHIPPED | OUTPATIENT
Start: 2022-02-21 | End: 2022-07-11 | Stop reason: SDUPTHER

## 2022-03-03 DIAGNOSIS — I10 ESSENTIAL HYPERTENSION: ICD-10-CM

## 2022-03-03 RX ORDER — LISINOPRIL 10 MG/1
TABLET ORAL
Qty: 30 TABLET | Refills: 2 | OUTPATIENT
Start: 2022-03-03

## 2022-03-04 DIAGNOSIS — F33.1 MODERATE EPISODE OF RECURRENT MAJOR DEPRESSIVE DISORDER (HCC): ICD-10-CM

## 2022-03-04 RX ORDER — FLUOXETINE HYDROCHLORIDE 40 MG/1
40 CAPSULE ORAL DAILY
Qty: 90 CAPSULE | Refills: 3 | OUTPATIENT
Start: 2022-03-04

## 2022-03-08 DIAGNOSIS — F33.1 MODERATE EPISODE OF RECURRENT MAJOR DEPRESSIVE DISORDER (HCC): ICD-10-CM

## 2022-03-08 RX ORDER — FLUOXETINE HYDROCHLORIDE 40 MG/1
40 CAPSULE ORAL DAILY
Qty: 90 CAPSULE | Refills: 1 | OUTPATIENT
Start: 2022-03-08

## 2022-04-14 ENCOUNTER — TELEMEDICINE (OUTPATIENT)
Dept: FAMILY MEDICINE CLINIC | Age: 57
End: 2022-04-14
Payer: COMMERCIAL

## 2022-04-14 DIAGNOSIS — F33.42 RECURRENT MAJOR DEPRESSIVE DISORDER, IN FULL REMISSION (HCC): ICD-10-CM

## 2022-04-14 PROCEDURE — 99213 OFFICE O/P EST LOW 20 MIN: CPT | Performed by: NURSE PRACTITIONER

## 2022-04-14 RX ORDER — FLUOXETINE HYDROCHLORIDE 40 MG/1
40 CAPSULE ORAL DAILY
Qty: 90 CAPSULE | Refills: 3 | Status: SHIPPED | OUTPATIENT
Start: 2022-04-14 | End: 2022-07-11 | Stop reason: SDUPTHER

## 2022-04-14 ASSESSMENT — ENCOUNTER SYMPTOMS
EYE PAIN: 0
EYE REDNESS: 0
NAUSEA: 0
COUGH: 0
ABDOMINAL PAIN: 0
RHINORRHEA: 0
COLOR CHANGE: 0
TROUBLE SWALLOWING: 0
WHEEZING: 0
SHORTNESS OF BREATH: 0
VOMITING: 0
SORE THROAT: 0
DIARRHEA: 0

## 2022-04-14 NOTE — PROGRESS NOTES
2022    TELEHEALTH EVALUATION -- Audio/Visual (During HMIQZ-74 public health emergency)    HPI:    Rosalba Rutherford (:  1965) has requested an audio/video evaluation for the following concern(s):    MDD  Moods are doing better, having good days almost all the time. Has occasional day every few weeks where she feels down, but otherwise doing well. Energy and motivation are good. Denies any side effects. Appetite is ok. Sleep is also better. Denies any SI/HI. Review of Systems   Constitutional: Negative for chills, diaphoresis and fatigue. HENT: Negative for congestion, rhinorrhea, sore throat and trouble swallowing. Eyes: Negative for pain, redness and visual disturbance. Respiratory: Negative for cough, shortness of breath and wheezing. Cardiovascular: Negative for chest pain, palpitations and leg swelling. Gastrointestinal: Negative for abdominal pain, diarrhea, nausea and vomiting. Endocrine: Negative for polydipsia, polyphagia and polyuria. Genitourinary: Negative for decreased urine volume, dysuria, frequency and urgency. Musculoskeletal: Negative for arthralgias and myalgias. Skin: Negative for color change and rash. Allergic/Immunologic: Negative for environmental allergies, food allergies and immunocompromised state. Neurological: Negative for dizziness, tremors, syncope and headaches. Hematological: Negative for adenopathy. Psychiatric/Behavioral: Negative for behavioral problems, confusion, self-injury and suicidal ideas. All other systems reviewed and are negative. Prior to Visit Medications    Medication Sig Taking?  Authorizing Provider   FLUoxetine (PROZAC) 40 MG capsule Take 1 capsule by mouth daily Yes MICK Burgess CNP   buPROPion (WELLBUTRIN XL) 150 MG extended release tablet Take 1 tablet by mouth every morning  MICK Burgess CNP   buPROPion (WELLBUTRIN XL) 300 MG extended release tablet Take 1 tablet by mouth every morning (take along with 150 mg Wellbutrin for total dose of 450 mg)  Smooth Issa, APRN - CNP   lisinopril (PRINIVIL;ZESTRIL) 10 MG tablet Take 1 tablet by mouth daily  Smooth Daughters, APRN - CNP   mupirocin [facility] NASAL) 2 % nasal ointment Take by Nasal route 2 times daily for 10 days. Smooth Luis Manuel, APRN - CNP   naproxen (NAPROSYN) 500 MG tablet Take 1 tablet by mouth 2 times daily as needed for Pain  Smooth Daughters, APRN - CNP   atorvastatin (LIPITOR) 80 MG tablet Take 1 tablet by mouth daily  Smooth Luis Manuel, APRN - CNP   omeprazole (PRILOSEC) 20 MG delayed release capsule Take 1 capsule by mouth Daily  Smooth Luis Manuel, APRN - CNP   acyclovir (ZOVIRAX) 400 MG tablet Take 1 tablet by mouth 2 times daily  Smoothnika Issa, APRN - CNP   albuterol sulfate HFA (PROAIR HFA) 108 (90 Base) MCG/ACT inhaler Inhale 2 puffs into the lungs every 6 hours as needed for Wheezing  Austin Anders, APRN - CNP   ibuprofen (ADVIL;MOTRIN) 800 MG tablet Take 1 tablet by mouth every 6 hours as needed for Pain  Smooth Daughters, APRN - CNP       Social History     Tobacco Use    Smoking status: Current Every Day Smoker     Packs/day: 0.50     Years: 35.00     Pack years: 17.50    Smokeless tobacco: Never Used   Substance Use Topics    Alcohol use:  Yes    Drug use: No        Allergies   Allergen Reactions    Flagyl [Metronidazole] Nausea And Vomiting   ,   Past Medical History:   Diagnosis Date    COPD, mild ([address] 2/15/2018    Depression     Hyperlipidemia     Hypertension    ,   Past Surgical History:   Procedure Laterality Date    ENDOMETRIAL ABLATION      OVARY REMOVAL      left    TONSILLECTOMY     , No family history on file.,   Immunization History   Administered Date(s) Administered    COVID-19, J&J, PF, 0.5 mL 05/19/2021    Influenza, Quadv, IM, (6 mo and older Fluzone, Flulaval, Fluarix and 3 yrs and older Afluria) 10/05/2017    Influenza, Quadv, IM, PF (6 mo and older Fluzone, Flulaval, Fluarix, and 3 yrs and older Abnormal-       Neurological:        [x] No Facial Asymmetry (Cranial nerve 7 motor function) (limited exam to video visit)          [x] No gaze palsy        [] Abnormal-         Skin:        [x] No significant exanthematous lesions or discoloration noted on facial skin         [] Abnormal-            Psychiatric:       [x] Normal Affect [x] No Hallucinations        [] Abnormal-     Other pertinent observable physical exam findings-     ASSESSMENT/PLAN:  1. Recurrent major depressive disorder, in full remission ([facility] [address]  - moods doing well on Prozac and Wellbutrin  - refill Prozac, see back in 1 year  - FLUoxetine (PROZAC) 40 MG capsule; Take 1 capsule by mouth daily  Dispense: 90 capsule; Refill: 3      Return in about 1 year (around 4/14/2023) for chronic conditions. Curt Fatima is a 62 y.o. female being evaluated by a Virtual Visit (video visit) encounter to address concerns as mentioned above. A caregiver was present when appropriate. Due to this being a TeleHealth encounter (During DII-34 public health emergency), evaluation of the following organ systems was limited: Vitals/Constitutional/EENT/Resp/CV/GI//MS/Neuro/Skin/Heme-Lymph-Imm. Pursuant to the emergency declaration under the [address] [place] [address] authority and the [employer] Act, this Virtual Visit was conducted with patient's (and/or legal guardian's) consent, to reduce the patient's risk of exposure to COVID-19 and provide necessary medical care. The patient (and/or legal guardian) has also been advised to contact this office for worsening conditions or problems, and seek emergency medical treatment and/or call 911 if deemed necessary. Services were provided through a video synchronous discussion virtually to substitute for in-person clinic visit. Patient and provider were located at their individual homes.     --MICK Stephens - CNP on

## 2022-06-13 DIAGNOSIS — K21.9 GASTROESOPHAGEAL REFLUX DISEASE, UNSPECIFIED WHETHER ESOPHAGITIS PRESENT: ICD-10-CM

## 2022-06-13 RX ORDER — OMEPRAZOLE 20 MG/1
20 CAPSULE, DELAYED RELEASE ORAL DAILY
Qty: 90 CAPSULE | Refills: 3 | OUTPATIENT
Start: 2022-06-13

## 2022-06-22 DIAGNOSIS — K21.9 GASTROESOPHAGEAL REFLUX DISEASE, UNSPECIFIED WHETHER ESOPHAGITIS PRESENT: ICD-10-CM

## 2022-06-22 RX ORDER — OMEPRAZOLE 20 MG/1
20 CAPSULE, DELAYED RELEASE ORAL DAILY
Qty: 90 CAPSULE | Refills: 3 | Status: SHIPPED | OUTPATIENT
Start: 2022-06-22 | End: 2022-07-11 | Stop reason: SDUPTHER

## 2022-07-11 DIAGNOSIS — F33.1 MODERATE EPISODE OF RECURRENT MAJOR DEPRESSIVE DISORDER (HCC): ICD-10-CM

## 2022-07-11 DIAGNOSIS — F33.42 RECURRENT MAJOR DEPRESSIVE DISORDER, IN FULL REMISSION (HCC): ICD-10-CM

## 2022-07-11 DIAGNOSIS — K21.9 GASTROESOPHAGEAL REFLUX DISEASE, UNSPECIFIED WHETHER ESOPHAGITIS PRESENT: ICD-10-CM

## 2022-07-11 DIAGNOSIS — F17.210 CIGARETTE NICOTINE DEPENDENCE WITHOUT COMPLICATION: ICD-10-CM

## 2022-07-11 DIAGNOSIS — E78.00 PURE HYPERCHOLESTEROLEMIA: ICD-10-CM

## 2022-07-11 DIAGNOSIS — I10 ESSENTIAL HYPERTENSION: ICD-10-CM

## 2022-07-11 DIAGNOSIS — F32.5 MAJOR DEPRESSIVE DISORDER WITH SINGLE EPISODE, IN FULL REMISSION (HCC): ICD-10-CM

## 2022-07-11 RX ORDER — OMEPRAZOLE 20 MG/1
20 CAPSULE, DELAYED RELEASE ORAL DAILY
Qty: 90 CAPSULE | Refills: 3 | Status: SHIPPED | OUTPATIENT
Start: 2022-07-11

## 2022-07-11 RX ORDER — ATORVASTATIN CALCIUM 80 MG/1
80 TABLET, FILM COATED ORAL DAILY
Qty: 90 TABLET | Refills: 3 | Status: SHIPPED | OUTPATIENT
Start: 2022-07-11

## 2022-07-11 RX ORDER — BUPROPION HYDROCHLORIDE 150 MG/1
150 TABLET ORAL EVERY MORNING
Qty: 90 TABLET | Refills: 3 | Status: SHIPPED | OUTPATIENT
Start: 2022-07-11

## 2022-07-11 RX ORDER — BUPROPION HYDROCHLORIDE 300 MG/1
300 TABLET ORAL EVERY MORNING
Qty: 90 TABLET | Refills: 3 | Status: SHIPPED | OUTPATIENT
Start: 2022-07-11 | End: 2023-07-11

## 2022-07-11 RX ORDER — FLUOXETINE HYDROCHLORIDE 40 MG/1
40 CAPSULE ORAL DAILY
Qty: 90 CAPSULE | Refills: 3 | Status: SHIPPED | OUTPATIENT
Start: 2022-07-11

## 2022-07-11 RX ORDER — LISINOPRIL 10 MG/1
10 TABLET ORAL DAILY
Qty: 90 TABLET | Refills: 1 | Status: SHIPPED | OUTPATIENT
Start: 2022-07-11

## 2023-04-18 ENCOUNTER — HOSPITAL ENCOUNTER (EMERGENCY)
Age: 58
Discharge: HOME | End: 2023-04-18
Attending: EMERGENCY MEDICINE
Payer: COMMERCIAL

## 2023-04-18 VITALS
DIASTOLIC BLOOD PRESSURE: 99 MMHG | HEART RATE: 66 BPM | BODY MASS INDEX: 37.56 KG/M2 | TEMPERATURE: 97.8 F | SYSTOLIC BLOOD PRESSURE: 141 MMHG | HEIGHT: 64 IN | OXYGEN SATURATION: 96 % | WEIGHT: 220 LBS | RESPIRATION RATE: 18 BRPM

## 2023-04-18 DIAGNOSIS — J06.9 ACUTE UPPER RESPIRATORY INFECTION: ICD-10-CM

## 2023-04-18 DIAGNOSIS — J02.9 VIRAL PHARYNGITIS: Primary | ICD-10-CM

## 2023-04-18 LAB
FLUAV RNA RESP QL NAA+PROBE: NOT DETECTED
FLUBV RNA RESP QL NAA+PROBE: NOT DETECTED
S PYO AG THROAT QL: NEGATIVE
S PYO THROAT QL CULT: NORMAL
SARS-COV-2 RNA RESP QL NAA+PROBE: NOT DETECTED

## 2023-04-18 PROCEDURE — 87880 STREP A ASSAY W/OPTIC: CPT

## 2023-04-18 PROCEDURE — 87636 SARSCOV2 & INF A&B AMP PRB: CPT

## 2023-04-18 PROCEDURE — 87070 CULTURE OTHR SPECIMN AEROBIC: CPT

## 2023-04-18 PROCEDURE — 6370000000 HC RX 637 (ALT 250 FOR IP)

## 2023-04-18 PROCEDURE — 6360000002 HC RX W HCPCS

## 2023-04-18 PROCEDURE — 99284 EMERGENCY DEPT VISIT MOD MDM: CPT

## 2023-04-18 PROCEDURE — 96372 THER/PROPH/DIAG INJ SC/IM: CPT

## 2023-04-18 RX ORDER — LIDOCAINE HYDROCHLORIDE 20 MG/ML
15 SOLUTION OROPHARYNGEAL ONCE
Status: COMPLETED | OUTPATIENT
Start: 2023-04-18 | End: 2023-04-18

## 2023-04-18 RX ORDER — KETOROLAC TROMETHAMINE 30 MG/ML
15 INJECTION, SOLUTION INTRAMUSCULAR; INTRAVENOUS ONCE
Status: COMPLETED | OUTPATIENT
Start: 2023-04-18 | End: 2023-04-18

## 2023-04-18 RX ADMIN — KETOROLAC TROMETHAMINE 15 MG: 30 INJECTION, SOLUTION INTRAMUSCULAR at 19:35

## 2023-04-18 RX ADMIN — Medication 15 ML: at 19:35

## 2023-04-18 ASSESSMENT — PAIN SCALES - GENERAL: PAINLEVEL_OUTOF10: 7

## 2023-04-18 ASSESSMENT — PAIN - FUNCTIONAL ASSESSMENT: PAIN_FUNCTIONAL_ASSESSMENT: 0-10

## 2023-04-18 ASSESSMENT — PAIN DESCRIPTION - LOCATION: LOCATION: THROAT

## 2023-04-18 NOTE — ED TRIAGE NOTES
Pt presents to the ER with c/o a cough and sore throat for the last week. Pt denies fevers or SOB. Pt has been taking aleve and dayquil at home.  Pt is alert and oriented, vss

## 2023-04-18 NOTE — ED PROVIDER NOTES
ATTENDING NOTE:    I supervised and discussed the history, physical exam and the management of this patient with the resident. I reviewed the resident's note and agree with the documented findings and plan of care. Please see my additional note. I personally saw and examined the patient. I have reviewed and agree with the resident's findings, including all diagnostic interpretations and treatment plans as written. I was present for the key portion of any procedures performed and the inclusive time noted in any critical care statement.     Electronically verified by Alvaro Thorne MD  04/18/23 5796
Documentation Reviewed:         Previous patient encounter documents & history available on EMR was reviewed              See Formal Diagnostic Results above for the lab and radiology tests and orders. 3)  Treatment and Disposition         ED Reassessment: See ED course        Please see ED course for further reassessments, treatments, MDM, and final disposition. Case discussed with consulting clinician:           Shared Decision-Making was performed and disposition discussed with the        Patient/Family and questions answered          Social determinants of health impacting treatment or disposition:           Code Status:        Summary of Patient Presentation:      MDM   /   ED Course as of 04/18/23 2044 Tue Apr 18, 2023 2043 Upon reevaluation patient reports improvement in symptoms after viscous lidocaine and Toradol. Patient was advised that she can get over-the-counter HurriCaine spray to help with numbing her throat and use ibuprofen Tylenol for the myalgias. Patient was advised that her swabs were negative and that she likely has a viral pharyngitis which is self-limiting disorder and treatment is symptomatic control. Shared decision-making was performed and patient agrees she is suitable for discharge at this time. All questions were answered and patient has no further concerns at this time. [GHISLAINE]      ED Course User Index  [GHISLAINE] Belinda Gandhi MD     Vitals Reviewed:    Vitals:    04/18/23 1858   BP: (!) 141/99   Pulse: 66   Resp: 18   Temp: 97.8 °F (36.6 °C)   TempSrc: Oral   SpO2: 96%   Weight: 220 lb (99.8 kg)   Height: 5' 4\" (1.626 m)       The patient was seen and examined. Appropriate diagnostic testing was performed and results reviewed with the patient. The results of pertinent diagnostic studies and exam findings were discussed. The patients provisional diagnosis and plan of care were discussed with the patient and present family who expressed understanding.  Any

## 2023-04-20 LAB — BACTERIA THROAT AEROBE CULT: NORMAL

## 2023-07-04 DIAGNOSIS — I10 ESSENTIAL HYPERTENSION: ICD-10-CM

## 2023-07-05 RX ORDER — LISINOPRIL 10 MG/1
TABLET ORAL
Qty: 90 TABLET | Refills: 1 | OUTPATIENT
Start: 2023-07-05

## 2023-07-05 NOTE — TELEPHONE ENCOUNTER
Pt notified and agreeable . Labs mailed to home address . [facility] Pregnancy And Lactation Text: The risk during pregnancy and breastfeeding is uncertain with this medication.

## 2023-07-06 ENCOUNTER — OFFICE VISIT (OUTPATIENT)
Dept: FAMILY MEDICINE CLINIC | Age: 58
End: 2023-07-06
Payer: COMMERCIAL

## 2023-07-06 VITALS
HEART RATE: 89 BPM | WEIGHT: 241.6 LBS | DIASTOLIC BLOOD PRESSURE: 78 MMHG | RESPIRATION RATE: 16 BRPM | SYSTOLIC BLOOD PRESSURE: 130 MMHG | BODY MASS INDEX: 41.25 KG/M2 | HEIGHT: 64 IN | TEMPERATURE: 98.3 F | OXYGEN SATURATION: 97 %

## 2023-07-06 DIAGNOSIS — Z12.11 SCREENING FOR COLON CANCER: ICD-10-CM

## 2023-07-06 DIAGNOSIS — Z12.31 ENCOUNTER FOR SCREENING MAMMOGRAM FOR MALIGNANT NEOPLASM OF BREAST: ICD-10-CM

## 2023-07-06 DIAGNOSIS — F33.42 RECURRENT MAJOR DEPRESSIVE DISORDER, IN FULL REMISSION (HCC): ICD-10-CM

## 2023-07-06 DIAGNOSIS — F17.210 CIGARETTE NICOTINE DEPENDENCE WITHOUT COMPLICATION: ICD-10-CM

## 2023-07-06 DIAGNOSIS — E78.00 PURE HYPERCHOLESTEROLEMIA: ICD-10-CM

## 2023-07-06 DIAGNOSIS — R73.01 IMPAIRED FASTING GLUCOSE: ICD-10-CM

## 2023-07-06 DIAGNOSIS — K21.9 GASTROESOPHAGEAL REFLUX DISEASE, UNSPECIFIED WHETHER ESOPHAGITIS PRESENT: ICD-10-CM

## 2023-07-06 DIAGNOSIS — F43.21 GRIEF: Primary | ICD-10-CM

## 2023-07-06 DIAGNOSIS — I10 ESSENTIAL HYPERTENSION: ICD-10-CM

## 2023-07-06 PROCEDURE — 99406 BEHAV CHNG SMOKING 3-10 MIN: CPT | Performed by: NURSE PRACTITIONER

## 2023-07-06 PROCEDURE — 99214 OFFICE O/P EST MOD 30 MIN: CPT | Performed by: NURSE PRACTITIONER

## 2023-07-06 PROCEDURE — 3078F DIAST BP <80 MM HG: CPT | Performed by: NURSE PRACTITIONER

## 2023-07-06 PROCEDURE — 3075F SYST BP GE 130 - 139MM HG: CPT | Performed by: NURSE PRACTITIONER

## 2023-07-06 RX ORDER — OMEPRAZOLE 20 MG/1
20 CAPSULE, DELAYED RELEASE ORAL DAILY
Qty: 90 CAPSULE | Refills: 3 | Status: SHIPPED | OUTPATIENT
Start: 2023-07-06

## 2023-07-06 RX ORDER — FLUOXETINE HYDROCHLORIDE 40 MG/1
40 CAPSULE ORAL DAILY
Qty: 90 CAPSULE | Refills: 3 | Status: SHIPPED | OUTPATIENT
Start: 2023-07-06

## 2023-07-06 RX ORDER — LISINOPRIL 10 MG/1
10 TABLET ORAL DAILY
Qty: 90 TABLET | Refills: 3 | Status: SHIPPED | OUTPATIENT
Start: 2023-07-06

## 2023-07-06 RX ORDER — ATORVASTATIN CALCIUM 80 MG/1
80 TABLET, FILM COATED ORAL DAILY
Qty: 90 TABLET | Refills: 3 | Status: SHIPPED | OUTPATIENT
Start: 2023-07-06

## 2023-07-06 RX ORDER — BUPROPION HYDROCHLORIDE 150 MG/1
150 TABLET ORAL EVERY MORNING
Qty: 90 TABLET | Refills: 3 | Status: SHIPPED | OUTPATIENT
Start: 2023-07-06

## 2023-07-06 RX ORDER — BUPROPION HYDROCHLORIDE 300 MG/1
300 TABLET ORAL EVERY MORNING
Qty: 90 TABLET | Refills: 3 | Status: SHIPPED | OUTPATIENT
Start: 2023-07-06 | End: 2024-07-05

## 2023-07-06 SDOH — ECONOMIC STABILITY: FOOD INSECURITY: WITHIN THE PAST 12 MONTHS, THE FOOD YOU BOUGHT JUST DIDN'T LAST AND YOU DIDN'T HAVE MONEY TO GET MORE.: NEVER TRUE

## 2023-07-06 SDOH — ECONOMIC STABILITY: HOUSING INSECURITY
IN THE LAST 12 MONTHS, WAS THERE A TIME WHEN YOU DID NOT HAVE A STEADY PLACE TO SLEEP OR SLEPT IN A SHELTER (INCLUDING NOW)?: NO

## 2023-07-06 SDOH — ECONOMIC STABILITY: FOOD INSECURITY: WITHIN THE PAST 12 MONTHS, YOU WORRIED THAT YOUR FOOD WOULD RUN OUT BEFORE YOU GOT MONEY TO BUY MORE.: NEVER TRUE

## 2023-07-06 SDOH — ECONOMIC STABILITY: INCOME INSECURITY: HOW HARD IS IT FOR YOU TO PAY FOR THE VERY BASICS LIKE FOOD, HOUSING, MEDICAL CARE, AND HEATING?: NOT HARD AT ALL

## 2023-07-06 ASSESSMENT — PATIENT HEALTH QUESTIONNAIRE - PHQ9
SUM OF ALL RESPONSES TO PHQ9 QUESTIONS 1 & 2: 1
9. THOUGHTS THAT YOU WOULD BE BETTER OFF DEAD, OR OF HURTING YOURSELF: 0
8. MOVING OR SPEAKING SO SLOWLY THAT OTHER PEOPLE COULD HAVE NOTICED. OR THE OPPOSITE, BEING SO FIGETY OR RESTLESS THAT YOU HAVE BEEN MOVING AROUND A LOT MORE THAN USUAL: 0
SUM OF ALL RESPONSES TO PHQ QUESTIONS 1-9: 8
2. FEELING DOWN, DEPRESSED OR HOPELESS: 1
7. TROUBLE CONCENTRATING ON THINGS, SUCH AS READING THE NEWSPAPER OR WATCHING TELEVISION: 3
1. LITTLE INTEREST OR PLEASURE IN DOING THINGS: 0
SUM OF ALL RESPONSES TO PHQ QUESTIONS 1-9: 8
5. POOR APPETITE OR OVEREATING: 0
10. IF YOU CHECKED OFF ANY PROBLEMS, HOW DIFFICULT HAVE THESE PROBLEMS MADE IT FOR YOU TO DO YOUR WORK, TAKE CARE OF THINGS AT HOME, OR GET ALONG WITH OTHER PEOPLE: 3
DEPRESSION UNABLE TO ASSESS: FUNCTIONAL CAPACITY MOTIVATION LIMITS ACCURACY
SUM OF ALL RESPONSES TO PHQ QUESTIONS 1-9: 8
SUM OF ALL RESPONSES TO PHQ QUESTIONS 1-9: 8
3. TROUBLE FALLING OR STAYING ASLEEP: 0
4. FEELING TIRED OR HAVING LITTLE ENERGY: 3
6. FEELING BAD ABOUT YOURSELF - OR THAT YOU ARE A FAILURE OR HAVE LET YOURSELF OR YOUR FAMILY DOWN: 1

## 2023-07-06 NOTE — PROGRESS NOTES
glucose  -     Hemoglobin A1C; Future        - current mood issues secondary to grief, she declines any need for medication adjustment, and I think this is very appropriate  - con't Wellbutrin and Prozac  - BP stable and well controlled, con't Lisinopril 10 mg  - con't Lipitor 80 mg  - refer to GI for screening colonoscopy  - mammogram  - annual labs  - does not meet criteria for CT lung cancer screening    On the date of the visit, I have spent 3 minutes in face to face time with patient counseling on smoking cessation    DISPOSITION    Return in about 1 year (around 7/6/2024) for chronic conditions. Linda Mancia released without restrictions. PATIENT COUNSELING    Counseling was provided today regarding the following topics: Healthy eating habits, Regular exercise, substance abuse and healthy sleep habits. Linda Mancia received counseling on the following healthy behaviors: medication adherence    Patient given educational materials on: See Attached    I have instructed Linda Mancia to complete a self tracking handout on none and instructed them to bring it with them to her next appointment. Barriers to learning and self management: none    Discussed use, benefit, and side effects of prescribed medications. Barriers to medication compliance addressed. All patient questions answered. Pt voiced understanding.        Electronically signed by MICK Herrera CNP on 7/6/2023 at 8:11 AM

## 2023-08-15 ENCOUNTER — OFFICE VISIT (OUTPATIENT)
Dept: FAMILY MEDICINE CLINIC | Age: 58
End: 2023-08-15
Payer: COMMERCIAL

## 2023-08-15 VITALS
HEART RATE: 90 BPM | RESPIRATION RATE: 12 BRPM | BODY MASS INDEX: 41.21 KG/M2 | DIASTOLIC BLOOD PRESSURE: 82 MMHG | SYSTOLIC BLOOD PRESSURE: 130 MMHG | OXYGEN SATURATION: 95 % | HEIGHT: 64 IN | TEMPERATURE: 97.2 F | WEIGHT: 241.4 LBS

## 2023-08-15 DIAGNOSIS — K21.9 GASTROESOPHAGEAL REFLUX DISEASE, UNSPECIFIED WHETHER ESOPHAGITIS PRESENT: ICD-10-CM

## 2023-08-15 DIAGNOSIS — J44.9 COPD, MILD (HCC): ICD-10-CM

## 2023-08-15 DIAGNOSIS — E66.01 CLASS 3 SEVERE OBESITY DUE TO EXCESS CALORIES WITH SERIOUS COMORBIDITY AND BODY MASS INDEX (BMI) OF 40.0 TO 44.9 IN ADULT (HCC): Primary | ICD-10-CM

## 2023-08-15 PROCEDURE — 3079F DIAST BP 80-89 MM HG: CPT | Performed by: NURSE PRACTITIONER

## 2023-08-15 PROCEDURE — 99214 OFFICE O/P EST MOD 30 MIN: CPT | Performed by: NURSE PRACTITIONER

## 2023-08-15 PROCEDURE — 3075F SYST BP GE 130 - 139MM HG: CPT | Performed by: NURSE PRACTITIONER

## 2023-08-15 RX ORDER — OMEPRAZOLE 40 MG/1
40 CAPSULE, DELAYED RELEASE ORAL DAILY
Qty: 90 CAPSULE | Refills: 1 | Status: SHIPPED | OUTPATIENT
Start: 2023-08-15

## 2023-08-15 RX ORDER — PHENTERMINE HYDROCHLORIDE 37.5 MG/1
37.5 TABLET ORAL
Qty: 30 TABLET | Refills: 0 | Status: SHIPPED | OUTPATIENT
Start: 2023-08-15 | End: 2023-09-14

## 2023-08-15 NOTE — PROGRESS NOTES
Chief Complaint   Patient presents with    Other     States feels like she has a knot in her throat, has been going on for about a week, thrown up twice. Feels nausea every time she eats. Burning sensation in the middle of her stomach off and on. Taking omeprazole but doesn't seem like its helping. History obtained from chart review and the patient. SUBJECTIVE:  Marleni Renae is a 62 y.o. female that presents today for GERD    GERD    HPI:     Symptoms:  heartburn, chest pain, vomiting, feels like something is stuck in her throat. Feels nauseated pretty much any time she eats, had to sleep in recliner because it's worse when lying down  Length of symptoms: 4 days  Current therapy and response:  tried Pepcid and Peptobismul  Recent change in symptoms? Yes  Symptoms associated with certain foods? No  Alcohol use? No  Tobacco use? Yes    Abdominal Pain? No  Mid Back Pain? No  Melena? No    HPI:    The patient presents to the office for a refill on phentermine (Adipex). This is month number 0 for this series. The patient has lost 0 pounds since the last visit. The patient has been prescribed phentermine as part of a weight loss regimen which also includes dietary restrictions and structured exercise program.  The patient reports compliance with the dietary restrictions is fair. The patient reports an exercise regimen which includes walking. Patient reports no adverse side effects from current medication regimen. ROS:  Gen: no fevers, chills, sweats. Cardiac: No chest pain, palpitations, syncope, lightheadedness. Pulmonary: No wheezes or dyspnea. GI: No nausea, vomiting, diarrhea, abdominal discomfort. Neuro: No tremors, headaches. Psych: No agitation or insomnia.     Age/Gender Health Maintenance    Lipid   Lab Results   Component Value Date    CHOL 122 01/06/2018    CHOL 230 (H) 09/02/2017    CHOL 215 (H) 05/20/2017     Lab Results   Component Value Date    TRIG 112

## 2023-09-08 DIAGNOSIS — E66.01 CLASS 3 SEVERE OBESITY DUE TO EXCESS CALORIES WITH SERIOUS COMORBIDITY AND BODY MASS INDEX (BMI) OF 40.0 TO 44.9 IN ADULT (HCC): Primary | ICD-10-CM

## 2023-09-08 RX ORDER — PHENTERMINE HYDROCHLORIDE 37.5 MG/1
37.5 TABLET ORAL
Qty: 30 TABLET | Refills: 0 | Status: SHIPPED | OUTPATIENT
Start: 2023-09-08 | End: 2023-10-08

## 2023-09-08 NOTE — TELEPHONE ENCOUNTER
Recent Visits  Date Type Provider Dept   08/15/23 Office Visit MICK Lemus CNP [facility]   07/06/23 Office Visit Ronit MICK Cedillo CNP [facility]   Showing recent visits within past 540 days with a meds authorizing provider and meeting all other requirements  Future Appointments  Date Type Provider Dept   11/15/23 Appointment MICK Lemus CNP [facility]   Showing future appointments within next 150 days with a meds authorizing provider and meeting all other requirements   Recent Visits  Date Type Provider Dept   08/15/23 Office Visit MICK Lemus CNP [facility]   07/06/23 Office Visit Ronit MICK Cedillo CNP [facility]   Showing recent visits within past 540 days with a meds authorizing provider and meeting all other requirements  Future Appointments  Date Type Provider Dept   11/15/23 Appointment MICK Lemus CNP [facility]   Showing future appointments within next 150 days with a meds authorizing provider and meeting all other requirements     Future Appointments   Date Time Provider [address]   11/15/2023  3:00 PM MICK Lemus [facility]   7/8/2024  7:40 AM MICK Lemus

## 2023-09-08 NOTE — TELEPHONE ENCOUNTER
ASSESSMENT & PLAN   Diagnosis Orders   1. Class 3 severe obesity due to excess calories with serious comorbidity and body mass index (BMI) of 40.0 to 44.9 in adult (HCC)  phentermine (ADIPEX-P) 37.5 MG tablet          OAARS reviewed and appropriate. Controlled Substances Monitoring: Periodic Controlled Substance Monitoring: Possible medication side effects, risk of tolerance/dependence & alternative treatments discussed., No signs of potential drug abuse or diversion identified.  Ashu Ojeda, DO)      Future Appointments   Date Time Provider [address] [place] [address]   11/15/2023  3:00 PM MICK Cooper CNP [facility]   7/8/2024  7:40 AM MICK Cooper

## 2023-11-08 DIAGNOSIS — E66.01 CLASS 3 SEVERE OBESITY DUE TO EXCESS CALORIES WITH SERIOUS COMORBIDITY AND BODY MASS INDEX (BMI) OF 40.0 TO 44.9 IN ADULT (HCC): ICD-10-CM

## 2023-11-08 NOTE — TELEPHONE ENCOUNTER
Recent Visits  Date Type Provider Dept   08/15/23 Office Visit MICK Guevara CNP [facility]   07/06/23 Office Visit MICK Guevara CNP [facility]   Showing recent visits within past 540 days with a meds authorizing provider and meeting all other requirements  Future Appointments  Date Type Provider Dept   11/15/23 Appointment MICK Guevara CNP [facility]   Showing future appointments within next 150 days with a meds authorizing provider and meeting all other requirements

## 2023-11-09 RX ORDER — PHENTERMINE HYDROCHLORIDE 37.5 MG/1
TABLET ORAL
Qty: 30 TABLET | Refills: 0 | Status: SHIPPED | OUTPATIENT
Start: 2023-11-09 | End: 2023-12-09

## 2023-11-09 NOTE — TELEPHONE ENCOUNTER
Controlled Substance Monitoring:    Acute and Chronic Pain Monitoring:   RX Monitoring Periodic Controlled Substance Monitoring   11/9/2023   9:24 AM No signs of potential drug abuse or diversion identified.

## 2023-11-14 ENCOUNTER — HOSPITAL ENCOUNTER (OUTPATIENT)
Age: 58
Discharge: HOME | End: 2023-11-14
Payer: COMMERCIAL

## 2023-11-14 DIAGNOSIS — I10 ESSENTIAL HYPERTENSION: ICD-10-CM

## 2023-11-14 DIAGNOSIS — R73.01 IMPAIRED FASTING GLUCOSE: ICD-10-CM

## 2023-11-14 DIAGNOSIS — E78.00 PURE HYPERCHOLESTEROLEMIA: ICD-10-CM

## 2023-11-14 LAB
ALBUMIN SERPL BCG-MCNC: 4.2 G/DL (ref 3.5–5.1)
ALP SERPL-CCNC: 119 U/L (ref 38–126)
ALT SERPL W/O P-5'-P-CCNC: 21 U/L (ref 11–66)
ANION GAP SERPL CALC-SCNC: 12 MEQ/L (ref 8–16)
AST SERPL-CCNC: 17 U/L (ref 5–40)
BASOPHILS ABSOLUTE: 0 THOU/MM3 (ref 0–0.1)
BASOPHILS NFR BLD AUTO: 0.6 %
BILIRUB SERPL-MCNC: 0.7 MG/DL (ref 0.3–1.2)
BUN SERPL-MCNC: 10 MG/DL (ref 7–22)
CALCIUM SERPL-MCNC: 9.7 MG/DL (ref 8.5–10.5)
CHLORIDE SERPL-SCNC: 105 MEQ/L (ref 98–111)
CHOLESTEROL, FASTING: 120 MG/DL (ref 100–199)
CO2 SERPL-SCNC: 26 MEQ/L (ref 23–33)
CREAT SERPL-MCNC: 0.8 MG/DL (ref 0.4–1.2)
DEPRECATED MEAN GLUCOSE BLD GHB EST-ACNC: 105 MG/DL (ref 70–126)
DEPRECATED RDW RBC AUTO: 47.9 FL (ref 35–45)
EOSINOPHIL NFR BLD AUTO: 3.5 %
EOSINOPHILS ABSOLUTE: 0.2 THOU/MM3 (ref 0–0.4)
ERYTHROCYTE [DISTWIDTH] IN BLOOD BY AUTOMATED COUNT: 13.2 % (ref 11.5–14.5)
GFR SERPL CREATININE-BSD FRML MDRD: > 60 ML/MIN/1.73M2
GLUCOSE SERPL-MCNC: 101 MG/DL (ref 70–108)
HBA1C MFR BLD HPLC: 5.5 % (ref 4.4–6.4)
HCT VFR BLD AUTO: 44.8 % (ref 37–47)
HDLC SERPL-MCNC: 46 MG/DL
HGB BLD-MCNC: 14.4 GM/DL (ref 12–16)
IMM GRANULOCYTES # BLD AUTO: 0.01 THOU/MM3 (ref 0–0.07)
IMM GRANULOCYTES NFR BLD AUTO: 0.1 %
LDLC SERPL CALC-MCNC: 55 MG/DL
LYMPHOCYTES ABSOLUTE: 2.3 THOU/MM3 (ref 1–4.8)
LYMPHOCYTES NFR BLD AUTO: 32.8 %
MCH RBC QN AUTO: 32 PG (ref 26–33)
MCHC RBC AUTO-ENTMCNC: 32.1 GM/DL (ref 32.2–35.5)
MCV RBC AUTO: 99.6 FL (ref 81–99)
MONOCYTES ABSOLUTE: 0.3 THOU/MM3 (ref 0.4–1.3)
MONOCYTES NFR BLD AUTO: 4.7 %
NEUTROPHILS NFR BLD AUTO: 58.3 %
NRBC BLD AUTO-RTO: 0 /100 WBC
PLATELET # BLD AUTO: 285 THOU/MM3 (ref 130–400)
PMV BLD AUTO: 11.3 FL (ref 9.4–12.4)
POTASSIUM SERPL-SCNC: 4.2 MEQ/L (ref 3.5–5.2)
PROT SERPL-MCNC: 6.9 G/DL (ref 6.1–8)
RBC # BLD AUTO: 4.5 MILL/MM3 (ref 4.2–5.4)
SEGMENTED NEUTROPHILS ABSOLUTE COUNT: 4.1 THOU/MM3 (ref 1.8–7.7)
SODIUM SERPL-SCNC: 143 MEQ/L (ref 135–145)
TRIGLYCERIDE, FASTING: 93 MG/DL (ref 0–199)
TSH SERPL DL<=0.005 MIU/L-ACNC: 2.06 UIU/ML (ref 0.4–4.2)
WBC # BLD AUTO: 7 THOU/MM3 (ref 4.8–10.8)

## 2023-11-14 PROCEDURE — 80061 LIPID PANEL: CPT

## 2023-11-14 PROCEDURE — 83036 HEMOGLOBIN GLYCOSYLATED A1C: CPT

## 2023-11-14 PROCEDURE — 84443 ASSAY THYROID STIM HORMONE: CPT

## 2023-11-14 PROCEDURE — 85025 COMPLETE CBC W/AUTO DIFF WBC: CPT

## 2023-11-14 PROCEDURE — 80053 COMPREHEN METABOLIC PANEL: CPT

## 2023-11-14 PROCEDURE — 36415 COLL VENOUS BLD VENIPUNCTURE: CPT

## 2023-11-15 ENCOUNTER — OFFICE VISIT (OUTPATIENT)
Dept: FAMILY MEDICINE CLINIC | Age: 58
End: 2023-11-15
Payer: COMMERCIAL

## 2023-11-15 VITALS
OXYGEN SATURATION: 98 % | BODY MASS INDEX: 39.95 KG/M2 | RESPIRATION RATE: 16 BRPM | TEMPERATURE: 97 F | HEIGHT: 64 IN | HEART RATE: 95 BPM | SYSTOLIC BLOOD PRESSURE: 124 MMHG | WEIGHT: 234 LBS | DIASTOLIC BLOOD PRESSURE: 70 MMHG

## 2023-11-15 DIAGNOSIS — E66.01 CLASS 3 SEVERE OBESITY DUE TO EXCESS CALORIES WITH SERIOUS COMORBIDITY AND BODY MASS INDEX (BMI) OF 40.0 TO 44.9 IN ADULT (HCC): Primary | ICD-10-CM

## 2023-11-15 PROCEDURE — 3074F SYST BP LT 130 MM HG: CPT | Performed by: NURSE PRACTITIONER

## 2023-11-15 PROCEDURE — 3078F DIAST BP <80 MM HG: CPT | Performed by: NURSE PRACTITIONER

## 2023-11-15 PROCEDURE — 99213 OFFICE O/P EST LOW 20 MIN: CPT | Performed by: NURSE PRACTITIONER

## 2023-12-07 ENCOUNTER — NURSE ONLY (OUTPATIENT)
Dept: FAMILY MEDICINE CLINIC | Age: 58
End: 2023-12-07

## 2023-12-07 ENCOUNTER — TELEPHONE (OUTPATIENT)
Dept: FAMILY MEDICINE CLINIC | Age: 58
End: 2023-12-07

## 2023-12-07 VITALS — BODY MASS INDEX: 39.34 KG/M2 | WEIGHT: 229.2 LBS

## 2023-12-07 DIAGNOSIS — E66.01 CLASS 3 SEVERE OBESITY DUE TO EXCESS CALORIES WITH SERIOUS COMORBIDITY AND BODY MASS INDEX (BMI) OF 40.0 TO 44.9 IN ADULT (HCC): Primary | ICD-10-CM

## 2023-12-07 DIAGNOSIS — E66.01 CLASS 3 SEVERE OBESITY DUE TO EXCESS CALORIES WITH SERIOUS COMORBIDITY AND BODY MASS INDEX (BMI) OF 40.0 TO 44.9 IN ADULT (HCC): ICD-10-CM

## 2023-12-07 RX ORDER — PHENTERMINE HYDROCHLORIDE 37.5 MG/1
TABLET ORAL
Qty: 30 TABLET | Refills: 0 | Status: SHIPPED | OUTPATIENT
Start: 2023-12-07 | End: 2024-01-05

## 2023-12-07 NOTE — TELEPHONE ENCOUNTER
Controlled Substance Monitoring:    Acute and Chronic Pain Monitoring:   RX Monitoring Periodic Controlled Substance Monitoring   12/7/2023   3:41 PM No signs of potential drug abuse or diversion identified. Rx sent to pharmacy. F/u with me in February. She'll just have to call for refills.

## 2023-12-07 NOTE — PROGRESS NOTES
Pt came in for a weight check due to being on Adipex.      Future Appointments   Date Time Provider [address]   12/7/2023  3:40 PM SCHEDULE, NURSE Oscar Rosales [facility]   2/15/2024  3:20 PM Sylvia Chavez APRN - [address] [facility]   7/8/2024  7:40 AM Selma Matos       Preferred pharmacy [facility] in [place]

## 2023-12-07 NOTE — TELEPHONE ENCOUNTER
Pt came in for a weight check due to being on Adipex.      Wt Readings from Last 3 Encounters:   12/07/23 104 kg (229 lb 3.2 oz)   11/15/23 106.1 kg (234 lb)   08/15/23 109.5 kg (241 lb 6.4 oz)        Preferred pharmacy [facility] in [place]

## 2023-12-14 ENCOUNTER — PATIENT MESSAGE (OUTPATIENT)
Dept: FAMILY MEDICINE CLINIC | Age: 58
End: 2023-12-14

## 2023-12-14 DIAGNOSIS — E66.01 CLASS 3 SEVERE OBESITY DUE TO EXCESS CALORIES WITH SERIOUS COMORBIDITY AND BODY MASS INDEX (BMI) OF 40.0 TO 44.9 IN ADULT (HCC): ICD-10-CM

## 2023-12-14 RX ORDER — PHENTERMINE HYDROCHLORIDE 37.5 MG/1
TABLET ORAL
Qty: 30 TABLET | Refills: 0 | Status: SHIPPED | OUTPATIENT
Start: 2023-12-14 | End: 2024-01-12

## 2023-12-14 NOTE — TELEPHONE ENCOUNTER
From: Latonia Covert  To: Yessi Tong  Sent: 12/14/2023 10:47 AM EST  Subject: Prescription refill    I have tried to  the Adipex at [facility] from the Dec appointment. They do not have it. Please advise-thanks.

## 2023-12-27 NOTE — TELEPHONE ENCOUNTER
Pt informed. Verbalized understanding. Pt scheduled at [facility] on 1/9/18 at 8am for PFT. Pt instructed to report to the [facility]nd [facility] heart center at 7:30am.     1. Light meal prior  2. No breathing meds 4-6 hr prior to test  3. Hold antihistamine 48 hr prior  4. No caffeine, smoking, alcohol day of test  5.  No exercising day of test.  6. Cover mouth to prevent breathing cold air day of test. Ambulatory

## 2024-01-10 DIAGNOSIS — E66.01 CLASS 3 SEVERE OBESITY DUE TO EXCESS CALORIES WITH SERIOUS COMORBIDITY AND BODY MASS INDEX (BMI) OF 40.0 TO 44.9 IN ADULT (HCC): ICD-10-CM

## 2024-01-10 RX ORDER — PHENTERMINE HYDROCHLORIDE 37.5 MG/1
TABLET ORAL
Qty: 30 TABLET | Refills: 0 | Status: SHIPPED | OUTPATIENT
Start: 2024-01-10 | End: 2024-02-08

## 2024-01-10 NOTE — TELEPHONE ENCOUNTER
Future Appointments   Date Time Provider Department Center   2/15/2024  3:20 PM Kendrick Vale APRN - CNP [facility] [place]   7/8/2024  7:40 AM Kendrick Vale APRN - CNP [facility] [place]

## 2024-01-10 NOTE — TELEPHONE ENCOUNTER
Controlled Substance Monitoring:    Acute and Chronic Pain Monitoring:   RX Monitoring Periodic Controlled Substance Monitoring   1/10/2024   7:42 AM No signs of potential drug abuse or diversion identified.

## 2024-02-15 ENCOUNTER — HOSPITAL ENCOUNTER (OUTPATIENT)
Dept: GENERAL RADIOLOGY | Age: 59
Discharge: HOME | End: 2024-02-15
Payer: COMMERCIAL

## 2024-02-15 ENCOUNTER — HOSPITAL ENCOUNTER (OUTPATIENT)
Age: 59
Discharge: HOME | End: 2024-02-15
Payer: COMMERCIAL

## 2024-02-15 ENCOUNTER — OFFICE VISIT (OUTPATIENT)
Dept: FAMILY MEDICINE CLINIC | Age: 59
End: 2024-02-15
Payer: COMMERCIAL

## 2024-02-15 VITALS
DIASTOLIC BLOOD PRESSURE: 84 MMHG | SYSTOLIC BLOOD PRESSURE: 116 MMHG | HEIGHT: 64 IN | BODY MASS INDEX: 38.1 KG/M2 | OXYGEN SATURATION: 98 % | RESPIRATION RATE: 12 BRPM | WEIGHT: 223.2 LBS | HEART RATE: 68 BPM | TEMPERATURE: 97.3 F

## 2024-02-15 DIAGNOSIS — M79.601 RIGHT ARM PAIN: ICD-10-CM

## 2024-02-15 DIAGNOSIS — E66.01 CLASS 2 SEVERE OBESITY DUE TO EXCESS CALORIES WITH SERIOUS COMORBIDITY AND BODY MASS INDEX (BMI) OF 38.0 TO 38.9 IN ADULT (HCC): ICD-10-CM

## 2024-02-15 DIAGNOSIS — G89.11 ACUTE PAIN OF RIGHT SHOULDER DUE TO TRAUMA: ICD-10-CM

## 2024-02-15 DIAGNOSIS — R07.81 RIB PAIN ON RIGHT SIDE: ICD-10-CM

## 2024-02-15 DIAGNOSIS — M25.511 ACUTE PAIN OF RIGHT SHOULDER DUE TO TRAUMA: ICD-10-CM

## 2024-02-15 DIAGNOSIS — M79.601 RIGHT ARM PAIN: Primary | ICD-10-CM

## 2024-02-15 PROCEDURE — 73060 X-RAY EXAM OF HUMERUS: CPT

## 2024-02-15 PROCEDURE — 71100 X-RAY EXAM RIBS UNI 2 VIEWS: CPT

## 2024-02-15 PROCEDURE — 99214 OFFICE O/P EST MOD 30 MIN: CPT | Performed by: NURSE PRACTITIONER

## 2024-02-15 PROCEDURE — 3079F DIAST BP 80-89 MM HG: CPT | Performed by: NURSE PRACTITIONER

## 2024-02-15 PROCEDURE — 73090 X-RAY EXAM OF FOREARM: CPT

## 2024-02-15 PROCEDURE — 73030 X-RAY EXAM OF SHOULDER: CPT

## 2024-02-15 PROCEDURE — 3074F SYST BP LT 130 MM HG: CPT | Performed by: NURSE PRACTITIONER

## 2024-02-15 RX ORDER — PHENTERMINE HYDROCHLORIDE 37.5 MG/1
TABLET ORAL
Qty: 30 TABLET | Refills: 0 | Status: SHIPPED | OUTPATIENT
Start: 2024-02-15 | End: 2024-03-15

## 2024-02-15 RX ORDER — TRAMADOL HYDROCHLORIDE 50 MG/1
50 TABLET ORAL EVERY 6 HOURS PRN
Qty: 28 TABLET | Refills: 0 | Status: SHIPPED | OUTPATIENT
Start: 2024-02-15 | End: 2024-02-22

## 2024-02-15 NOTE — PROGRESS NOTES
Chief Complaint   Patient presents with    Weight Management     Fell a week before denise out of her car. Is not able to do much with right arm. States when she fell had caught herself with right arm. States pain is worsening radiating to back. Constant pain, pain described as shooting pain. Hand and wrist throbs. Denies numbness and tingling. Has tried ice, tens unit, sling with not much improvement.        History obtained from chart review and the patient.    SUBJECTIVE:  Valeria Chen is a 58 y.o. female that presents today for obesity    HPI:    The patient presents to the office for a refill on phentermine (Adipex).  This is month number 3 for this series.  The patient has lost 11 pounds since the last visit.     The patient has been prescribed phentermine as part of a weight loss regimen which also includes dietary restrictions and structured exercise program.  The patient reports compliance with the dietary restrictions is good. She is shooting for about 1200 calories/day. The patient reports an exercise regimen which includes walking, but hasn't been walking as much.    Patient reports no adverse side effects from current medication regimen.    ROS:  Gen: no fevers, chills, sweats.   Cardiac: No chest pain, palpitations, syncope, lightheadedness.   Pulmonary: No wheezes or dyspnea.   GI: No nausea, vomiting, diarrhea, abdominal discomfort.   Neuro: No tremors, headaches.   Psych: No agitation or insomnia.    Wt Readings from Last 3 Encounters:   02/15/24 101.2 kg (223 lb 3.2 oz)   12/07/23 104 kg (229 lb 3.2 oz)   11/15/23 106.1 kg (234 lb)       Patient fell in a restaurant parking lot the week before Christmas  She braced herself with her right arm when she fell  It was getting better, but now having pain in her right shoulder  Can hardly lift her arm now    Age/Gender Health Maintenance    Lipid   Lab Results   Component Value Date    CHOL 122 01/06/2018    CHOL 230 (H) 09/02/2017    CHOL 215

## 2024-02-16 ENCOUNTER — TELEPHONE (OUTPATIENT)
Dept: FAMILY MEDICINE CLINIC | Age: 59
End: 2024-02-16

## 2024-02-16 DIAGNOSIS — M25.511 ACUTE PAIN OF RIGHT SHOULDER DUE TO TRAUMA: ICD-10-CM

## 2024-02-16 DIAGNOSIS — M79.601 RIGHT ARM PAIN: Primary | ICD-10-CM

## 2024-02-16 DIAGNOSIS — G89.11 ACUTE PAIN OF RIGHT SHOULDER DUE TO TRAUMA: ICD-10-CM

## 2024-02-16 DIAGNOSIS — M75.01 ADHESIVE CAPSULITIS OF RIGHT SHOULDER: ICD-10-CM

## 2024-02-16 NOTE — TELEPHONE ENCOUNTER
----- Message from MICK Vasques - CNP sent at 2/16/2024  8:17 AM EST -----  Let Valeria know all her xrays are normal. I would probably do some therapy, particularly for the shoulder. Is she ok with this? Where does she want to go?

## 2024-02-16 NOTE — TELEPHONE ENCOUNTER
Called patient and informed of message.   Patient verbalized understanding.  No questions or concerns.     States okay with doing therapy. Would like to go to [facility] as this is close to her work.     Please order, thank you.

## 2024-03-29 DIAGNOSIS — B00.1 RECURRENT COLD SORES: ICD-10-CM

## 2024-03-29 DIAGNOSIS — F33.42 RECURRENT MAJOR DEPRESSIVE DISORDER, IN FULL REMISSION (HCC): ICD-10-CM

## 2024-03-29 RX ORDER — ACYCLOVIR 400 MG/1
400 TABLET ORAL 2 TIMES DAILY
Qty: 180 TABLET | Refills: 3 | Status: SHIPPED | OUTPATIENT
Start: 2024-03-29 | End: 2025-03-29

## 2024-03-29 RX ORDER — BUPROPION HYDROCHLORIDE 150 MG/1
150 TABLET ORAL EVERY MORNING
Qty: 90 TABLET | Refills: 3 | Status: SHIPPED | OUTPATIENT
Start: 2024-03-29

## 2024-03-29 NOTE — TELEPHONE ENCOUNTER
Recent Visits  Date Type Provider Dept   02/15/24 Office Visit Kendrick Vale APRN - CNP [facility]   11/15/23 Office Visit Kendrick Vale APRN - CNP [facility]   08/15/23 Office Visit Kendrick Vale APRN - CNP [facility]   07/06/23 Office Visit Kendrick Vale APRN - CNP [facility]   Showing recent visits within past 540 days with a meds authorizing provider and meeting all other requirements  Future Appointments  Date Type Provider Dept   05/16/24 Appointment Kendrick Vale APRN - CNP [facility]   07/08/24 Appointment Kendrick Vale APRN - CNP [facility]   Showing future appointments within next 150 days with a meds authorizing provider and meeting all other requirements

## 2024-04-01 DIAGNOSIS — F33.42 RECURRENT MAJOR DEPRESSIVE DISORDER, IN FULL REMISSION (HCC): ICD-10-CM

## 2024-04-01 DIAGNOSIS — B00.1 RECURRENT COLD SORES: ICD-10-CM

## 2024-04-01 RX ORDER — BUPROPION HYDROCHLORIDE 150 MG/1
150 TABLET ORAL EVERY MORNING
Qty: 90 TABLET | Refills: 3 | Status: SHIPPED | OUTPATIENT
Start: 2024-04-01

## 2024-04-01 RX ORDER — ACYCLOVIR 400 MG/1
400 TABLET ORAL 2 TIMES DAILY
Qty: 180 TABLET | Refills: 3 | Status: SHIPPED | OUTPATIENT
Start: 2024-04-01 | End: 2025-04-01

## 2024-04-09 ENCOUNTER — COMMUNITY OUTREACH (OUTPATIENT)
Dept: FAMILY MEDICINE CLINIC | Age: 59
End: 2024-04-09

## 2024-04-29 DIAGNOSIS — E66.01 CLASS 2 SEVERE OBESITY DUE TO EXCESS CALORIES WITH SERIOUS COMORBIDITY AND BODY MASS INDEX (BMI) OF 38.0 TO 38.9 IN ADULT (HCC): ICD-10-CM

## 2024-04-29 RX ORDER — PHENTERMINE HYDROCHLORIDE 37.5 MG/1
TABLET ORAL
Qty: 30 TABLET | Refills: 0 | Status: SHIPPED | OUTPATIENT
Start: 2024-04-29 | End: 2024-04-29

## 2024-04-29 NOTE — TELEPHONE ENCOUNTER
Controlled Substance Monitoring:    Acute and Chronic Pain Monitoring:   RX Monitoring Periodic Controlled Substance Monitoring   4/29/2024  12:35 PM No signs of potential drug abuse or diversion identified.

## 2024-05-16 ENCOUNTER — OFFICE VISIT (OUTPATIENT)
Dept: FAMILY MEDICINE CLINIC | Age: 59
End: 2024-05-16
Payer: COMMERCIAL

## 2024-05-16 VITALS
TEMPERATURE: 97 F | RESPIRATION RATE: 12 BRPM | SYSTOLIC BLOOD PRESSURE: 132 MMHG | HEART RATE: 100 BPM | WEIGHT: 223 LBS | DIASTOLIC BLOOD PRESSURE: 78 MMHG | BODY MASS INDEX: 38.07 KG/M2 | OXYGEN SATURATION: 95 % | HEIGHT: 64 IN

## 2024-05-16 DIAGNOSIS — K21.9 GASTROESOPHAGEAL REFLUX DISEASE, UNSPECIFIED WHETHER ESOPHAGITIS PRESENT: ICD-10-CM

## 2024-05-16 DIAGNOSIS — F33.42 RECURRENT MAJOR DEPRESSIVE DISORDER, IN FULL REMISSION (HCC): Primary | ICD-10-CM

## 2024-05-16 DIAGNOSIS — Z12.31 ENCOUNTER FOR SCREENING MAMMOGRAM FOR MALIGNANT NEOPLASM OF BREAST: ICD-10-CM

## 2024-05-16 DIAGNOSIS — F17.210 CIGARETTE NICOTINE DEPENDENCE, UNCOMPLICATED: ICD-10-CM

## 2024-05-16 DIAGNOSIS — J44.9 COPD, MILD (HCC): ICD-10-CM

## 2024-05-16 PROBLEM — F33.1 MODERATE EPISODE OF RECURRENT MAJOR DEPRESSIVE DISORDER (HCC): Status: RESOLVED | Noted: 2024-05-16 | Resolved: 2024-05-16

## 2024-05-16 PROBLEM — F33.1 MODERATE EPISODE OF RECURRENT MAJOR DEPRESSIVE DISORDER (HCC): Status: ACTIVE | Noted: 2024-05-16

## 2024-05-16 PROCEDURE — 99214 OFFICE O/P EST MOD 30 MIN: CPT | Performed by: NURSE PRACTITIONER

## 2024-05-16 PROCEDURE — G0296 VISIT TO DETERM LDCT ELIG: HCPCS | Performed by: NURSE PRACTITIONER

## 2024-05-16 PROCEDURE — 3075F SYST BP GE 130 - 139MM HG: CPT | Performed by: NURSE PRACTITIONER

## 2024-05-16 PROCEDURE — 3078F DIAST BP <80 MM HG: CPT | Performed by: NURSE PRACTITIONER

## 2024-05-16 RX ORDER — OMEPRAZOLE 40 MG/1
40 CAPSULE, DELAYED RELEASE ORAL DAILY
Qty: 90 CAPSULE | Refills: 1 | Status: SHIPPED | OUTPATIENT
Start: 2024-05-16

## 2024-05-16 NOTE — PROGRESS NOTES
Chief Complaint   Patient presents with    Weight Management       History obtained from chart review and the patient.    SUBJECTIVE:  Valeria Chen is a 59 y.o. female that presents today for obesity    HPI:    The patient presents to the office for a refill on phentermine (Adipex).  This is month number 6 for this series.  The patient has lost 0 pounds since the last visit.     The patient has been prescribed phentermine as part of a weight loss regimen which also includes dietary restrictions and structured exercise program.  The patient reports compliance with the dietary restrictions is good. She is shooting for about 1200 calories/day, but struggles with it. The patient reports an exercise regimen which includes walking, but hasn't been walking as much.    Patient reports no adverse side effects from current medication regimen.    ROS:  Gen: no fevers, chills, sweats.   Cardiac: No chest pain, palpitations, syncope, lightheadedness.   Pulmonary: No wheezes or dyspnea.   GI: No nausea, vomiting, diarrhea, abdominal discomfort.   Neuro: No tremors, headaches.   Psych: No agitation or insomnia.    Considering doing weight watchers  Has a friend who is also doing it and has lost about 14 pounds in the last 2-3 months    Wt Readings from Last 3 Encounters:   05/16/24 101.2 kg (223 lb)   02/15/24 101.2 kg (223 lb 3.2 oz)   12/07/23 104 kg (229 lb 3.2 oz)       MDD  Depression is doing well  Not feeling depressed  Taking Prozac 40 mg and Wellbutrin 450 mg    Age/Gender Health Maintenance    Lipid   Lab Results   Component Value Date    CHOL 122 01/06/2018    CHOL 230 (H) 09/02/2017    CHOL 215 (H) 05/20/2017     Lab Results   Component Value Date    TRIG 112 01/06/2018    TRIG 179 09/02/2017    TRIG 155 05/20/2017     Lab Results   Component Value Date    HDL 46 11/14/2023    HDL 50 12/04/2021    HDL 48 11/24/2020     No components found for: \"LDLCHOLESTEROL\", \"LDLCALC\"    DM Screen  Lab Results   Component  Dr. Chin

## 2024-06-11 ENCOUNTER — HOSPITAL ENCOUNTER (OUTPATIENT)
Dept: WOMENS IMAGING | Age: 59
Discharge: HOME | End: 2024-06-11
Payer: COMMERCIAL

## 2024-06-11 ENCOUNTER — HOSPITAL ENCOUNTER (OUTPATIENT)
Dept: CT IMAGING | Age: 59
Discharge: HOME | End: 2024-06-11
Payer: COMMERCIAL

## 2024-06-11 VITALS — WEIGHT: 220 LBS | HEIGHT: 64 IN | BODY MASS INDEX: 37.56 KG/M2

## 2024-06-11 DIAGNOSIS — F17.210 CIGARETTE NICOTINE DEPENDENCE, UNCOMPLICATED: ICD-10-CM

## 2024-06-11 DIAGNOSIS — Z12.31 ENCOUNTER FOR SCREENING MAMMOGRAM FOR MALIGNANT NEOPLASM OF BREAST: ICD-10-CM

## 2024-06-11 PROCEDURE — 71271 CT THORAX LUNG CANCER SCR C-: CPT

## 2024-06-11 PROCEDURE — 77063 BREAST TOMOSYNTHESIS BI: CPT

## 2024-06-12 ENCOUNTER — TELEPHONE (OUTPATIENT)
Dept: FAMILY MEDICINE CLINIC | Age: 59
End: 2024-06-12

## 2024-06-12 NOTE — TELEPHONE ENCOUNTER
----- Message from MICK Vasques - CNP sent at 6/12/2024 10:25 AM EDT -----  Let Valeria know her CT of her chest remains stable. No evidence of malignancy. Rec'd repeating in 1 year, will call closer to the time.

## 2024-06-13 ENCOUNTER — HOSPITAL ENCOUNTER (OUTPATIENT)
Dept: WOMENS IMAGING | Age: 59
Discharge: HOME | End: 2024-06-13
Attending: RADIOLOGY

## 2024-06-13 ENCOUNTER — TELEPHONE (OUTPATIENT)
Dept: FAMILY MEDICINE CLINIC | Age: 59
End: 2024-06-13

## 2024-06-13 DIAGNOSIS — Z00.6 ENCOUNTER FOR EXAMINATION FOR NORMAL COMPARISON OR CONTROL IN CLINICAL RESEARCH PROGRAM: ICD-10-CM

## 2024-06-13 NOTE — TELEPHONE ENCOUNTER
----- Message from MICK Vasques - CNP sent at 6/13/2024  8:24 AM EDT -----  Let Valeria know her mammogram is normal.

## 2024-06-21 DIAGNOSIS — I10 ESSENTIAL HYPERTENSION: ICD-10-CM

## 2024-06-21 RX ORDER — LISINOPRIL 10 MG/1
10 TABLET ORAL DAILY
Qty: 90 TABLET | Refills: 3 | Status: SHIPPED | OUTPATIENT
Start: 2024-06-21

## 2024-07-24 ENCOUNTER — TELEPHONE (OUTPATIENT)
Dept: FAMILY MEDICINE CLINIC | Age: 59
End: 2024-07-24

## 2024-07-24 ENCOUNTER — OFFICE VISIT (OUTPATIENT)
Dept: FAMILY MEDICINE CLINIC | Age: 59
End: 2024-07-24
Payer: COMMERCIAL

## 2024-07-24 VITALS
SYSTOLIC BLOOD PRESSURE: 100 MMHG | HEART RATE: 78 BPM | DIASTOLIC BLOOD PRESSURE: 64 MMHG | OXYGEN SATURATION: 98 % | BODY MASS INDEX: 38.28 KG/M2 | RESPIRATION RATE: 20 BRPM | TEMPERATURE: 97.6 F | HEIGHT: 64 IN | WEIGHT: 224.2 LBS

## 2024-07-24 DIAGNOSIS — E66.01 CLASS 2 SEVERE OBESITY DUE TO EXCESS CALORIES WITH SERIOUS COMORBIDITY AND BODY MASS INDEX (BMI) OF 38.0 TO 38.9 IN ADULT (HCC): Primary | ICD-10-CM

## 2024-07-24 DIAGNOSIS — F33.42 RECURRENT MAJOR DEPRESSIVE DISORDER, IN FULL REMISSION (HCC): ICD-10-CM

## 2024-07-24 PROBLEM — E66.813 CLASS 3 SEVERE OBESITY DUE TO EXCESS CALORIES WITH SERIOUS COMORBIDITY AND BODY MASS INDEX (BMI) OF 40.0 TO 44.9 IN ADULT: Status: RESOLVED | Noted: 2019-06-06 | Resolved: 2024-07-24

## 2024-07-24 PROCEDURE — 99214 OFFICE O/P EST MOD 30 MIN: CPT | Performed by: NURSE PRACTITIONER

## 2024-07-24 PROCEDURE — 3074F SYST BP LT 130 MM HG: CPT | Performed by: NURSE PRACTITIONER

## 2024-07-24 PROCEDURE — 3078F DIAST BP <80 MM HG: CPT | Performed by: NURSE PRACTITIONER

## 2024-07-24 RX ORDER — BUPROPION HYDROCHLORIDE 150 MG/1
150 TABLET ORAL EVERY MORNING
Qty: 90 TABLET | Refills: 3 | Status: SHIPPED | OUTPATIENT
Start: 2024-07-24

## 2024-07-24 RX ORDER — SEMAGLUTIDE 0.25 MG/.5ML
0.25 INJECTION, SOLUTION SUBCUTANEOUS
Qty: 2 ML | Refills: 0 | Status: SHIPPED | OUTPATIENT
Start: 2024-07-24 | End: 2024-08-15

## 2024-07-24 NOTE — TELEPHONE ENCOUNTER
Ok, can you let her know? She was also going to check into cost savings on their website. If she's unable to get any additional savings, I do have a compound pharmacy that I use, but it's out of pocket and it can be $300-500 per month.

## 2024-07-24 NOTE — TELEPHONE ENCOUNTER
Tried to complete a prior authorization for patients Wegovy prescription.     Unable to send PA through to insurance, insurance will not cover medication.     Please advise, thank you.

## 2024-07-24 NOTE — PROGRESS NOTES
following topics: Healthy eating habits, Regular exercise, substance abuse and healthy sleep habits.    Valeria received counseling on the following healthy behaviors: medication adherence    Patient given educational materials on: See Attached    I have instructed Valeria to complete a self tracking handout on none and instructed them to bring it with them to her next appointment.     Barriers to learning and self management: none    Discussed use, benefit, and side effects of prescribed medications.  Barriers to medication compliance addressed.  All patient questions answered.  Pt voiced understanding.       Electronically signed by MICK Vasques CNP on 7/24/2024 at 3:12 PM

## 2024-07-25 RX ORDER — PHENTERMINE HYDROCHLORIDE 37.5 MG/1
37.5 TABLET ORAL
Qty: 30 TABLET | Refills: 0 | Status: SHIPPED | OUTPATIENT
Start: 2024-07-25 | End: 2024-08-24

## 2024-07-25 NOTE — TELEPHONE ENCOUNTER
Pt informed and verbalized understanding.     Pt would like to continue with the carlotta Joseph/ [facility]

## 2024-07-25 NOTE — TELEPHONE ENCOUNTER
Rx sent to pharmacy.    Controlled Substance Monitoring:    Acute and Chronic Pain Monitoring:   RX Monitoring Periodic Controlled Substance Monitoring   7/25/2024  10:14 AM No signs of potential drug abuse or diversion identified.

## 2024-08-03 DIAGNOSIS — F33.42 RECURRENT MAJOR DEPRESSIVE DISORDER, IN FULL REMISSION (HCC): ICD-10-CM

## 2024-08-05 RX ORDER — BUPROPION HYDROCHLORIDE 300 MG/1
TABLET ORAL
Qty: 90 TABLET | Refills: 3 | Status: SHIPPED | OUTPATIENT
Start: 2024-08-05

## 2024-08-05 NOTE — TELEPHONE ENCOUNTER
Future Appointments   Date Time Provider Department Center   10/24/2024  3:00 PM Kendrick Vale, APRN - CNP [facility]

## 2024-08-27 DIAGNOSIS — I10 ESSENTIAL HYPERTENSION: ICD-10-CM

## 2024-08-27 DIAGNOSIS — F33.42 RECURRENT MAJOR DEPRESSIVE DISORDER, IN FULL REMISSION (HCC): ICD-10-CM

## 2024-08-27 DIAGNOSIS — E78.00 PURE HYPERCHOLESTEROLEMIA: ICD-10-CM

## 2024-08-27 DIAGNOSIS — K21.9 GASTROESOPHAGEAL REFLUX DISEASE, UNSPECIFIED WHETHER ESOPHAGITIS PRESENT: ICD-10-CM

## 2024-08-27 RX ORDER — FLUOXETINE 40 MG/1
40 CAPSULE ORAL DAILY
Qty: 90 CAPSULE | Refills: 3 | Status: SHIPPED | OUTPATIENT
Start: 2024-08-27

## 2024-08-27 RX ORDER — LISINOPRIL 10 MG/1
10 TABLET ORAL DAILY
Qty: 90 TABLET | Refills: 3 | Status: SHIPPED | OUTPATIENT
Start: 2024-08-27

## 2024-08-27 RX ORDER — ATORVASTATIN CALCIUM 80 MG/1
80 TABLET, FILM COATED ORAL DAILY
Qty: 90 TABLET | Refills: 3 | Status: SHIPPED | OUTPATIENT
Start: 2024-08-27

## 2024-08-27 RX ORDER — OMEPRAZOLE 40 MG/1
40 CAPSULE, DELAYED RELEASE ORAL DAILY
Qty: 90 CAPSULE | Refills: 3 | Status: SHIPPED | OUTPATIENT
Start: 2024-08-27

## 2024-08-27 NOTE — TELEPHONE ENCOUNTER
Recent Visits  Date Type Provider Dept   07/24/24 Office Visit Kendrick Vale APRN - CNP [facility]   05/16/24 Office Visit Kendrick Vale APRN - [facility]NP [facility]   02/15/24 Office Visit Kendrick Vale APRN - [facility]   11/15/23 Office Visit Kendrick Vale APRN - [facility]   08/15/23 Office Visit Kendrick Vale APRN - [facility]   07/06/23 Office Visit Kendrick Vale APRN - [facility]   Showing recent visits within past 540 days with a meds authorizing provider and meeting all other requirements  Future Appointments  Date Type Provider Dept   10/24/24 Appointment Kendrick Vale APRN - CNP [facility]   Showing future appointments within next 150 days with a meds authorizing provider and meeting all other requirements       Normal for race

## 2024-08-30 ENCOUNTER — PATIENT MESSAGE (OUTPATIENT)
Dept: FAMILY MEDICINE CLINIC | Age: 59
End: 2024-08-30

## 2024-08-30 DIAGNOSIS — E66.01 CLASS 2 SEVERE OBESITY DUE TO EXCESS CALORIES WITH SERIOUS COMORBIDITY AND BODY MASS INDEX (BMI) OF 38.0 TO 38.9 IN ADULT (HCC): ICD-10-CM

## 2024-08-30 RX ORDER — PHENTERMINE HYDROCHLORIDE 37.5 MG/1
37.5 TABLET ORAL
Qty: 30 TABLET | Refills: 0 | Status: SHIPPED | OUTPATIENT
Start: 2024-08-30 | End: 2024-09-29

## 2024-08-30 NOTE — TELEPHONE ENCOUNTER
Recent Visits  Date Type Provider Dept   07/24/24 Office Visit Kendrick Vale APRN - CNP [facility]   05/16/24 Office Visit Kendrick Vale APRN - [facility]NP [facility]   02/15/24 Office Visit Kendrick Vale APRN - [facility]   11/15/23 Office Visit Kendrick Vale APRN - [facility]NP [facility]   08/15/23 Office Visit Kendrick Vale APRN - [facility]NP [facility]   07/06/23 Office Visit Kendrick Vale APRN - [facility]   Showing recent visits within past 540 days with a meds authorizing provider and meeting all other requirements  Future Appointments  Date Type Provider Dept   10/24/24 Appointment Kendrick Vale APRN - CNP [facility]   Showing future appointments within next 150 days with a meds authorizing provider and meeting all other requirements

## 2024-08-30 NOTE — TELEPHONE ENCOUNTER
Controlled Substance Monitoring:    Acute and Chronic Pain Monitoring:   RX Monitoring Periodic Controlled Substance Monitoring   8/30/2024  12:00 PM No signs of potential drug abuse or diversion identified.

## 2025-03-28 ENCOUNTER — OFFICE VISIT (OUTPATIENT)
Dept: FAMILY MEDICINE CLINIC | Age: 60
End: 2025-03-28

## 2025-03-28 VITALS
SYSTOLIC BLOOD PRESSURE: 124 MMHG | BODY MASS INDEX: 34.38 KG/M2 | HEIGHT: 64 IN | RESPIRATION RATE: 12 BRPM | DIASTOLIC BLOOD PRESSURE: 70 MMHG | OXYGEN SATURATION: 100 % | HEART RATE: 83 BPM | TEMPERATURE: 96.8 F | WEIGHT: 201.4 LBS

## 2025-03-28 DIAGNOSIS — H60.543 ECZEMA OF BOTH EXTERNAL EARS: Primary | ICD-10-CM

## 2025-03-28 DIAGNOSIS — I10 ESSENTIAL HYPERTENSION: ICD-10-CM

## 2025-03-28 DIAGNOSIS — J01.90 ACUTE RHINOSINUSITIS: ICD-10-CM

## 2025-03-28 DIAGNOSIS — H01.139 ECZEMA OF EYELID, UNSPECIFIED LATERALITY: ICD-10-CM

## 2025-03-28 RX ORDER — AZITHROMYCIN 250 MG/1
TABLET, FILM COATED ORAL
Qty: 6 TABLET | Refills: 0 | Status: SHIPPED | OUTPATIENT
Start: 2025-03-28 | End: 2025-04-07

## 2025-03-28 RX ORDER — HYDROCORTISONE 25 MG/G
CREAM TOPICAL
Qty: 20 G | Refills: 1 | Status: SHIPPED | OUTPATIENT
Start: 2025-03-28

## 2025-03-28 RX ORDER — FLUTICASONE PROPIONATE 50 MCG
2 SPRAY, SUSPENSION (ML) NASAL DAILY
Qty: 48 G | Refills: 0 | Status: SHIPPED | OUTPATIENT
Start: 2025-03-28

## 2025-03-28 RX ORDER — LORATADINE 10 MG/1
10 TABLET ORAL DAILY
Qty: 30 TABLET | Refills: 0 | Status: SHIPPED | OUTPATIENT
Start: 2025-03-28

## 2025-03-28 SDOH — ECONOMIC STABILITY: FOOD INSECURITY: WITHIN THE PAST 12 MONTHS, THE FOOD YOU BOUGHT JUST DIDN'T LAST AND YOU DIDN'T HAVE MONEY TO GET MORE.: NEVER TRUE

## 2025-03-28 SDOH — ECONOMIC STABILITY: FOOD INSECURITY: WITHIN THE PAST 12 MONTHS, YOU WORRIED THAT YOUR FOOD WOULD RUN OUT BEFORE YOU GOT MONEY TO BUY MORE.: NEVER TRUE

## 2025-03-28 ASSESSMENT — PATIENT HEALTH QUESTIONNAIRE - PHQ9
9. THOUGHTS THAT YOU WOULD BE BETTER OFF DEAD, OR OF HURTING YOURSELF: NOT AT ALL
4. FEELING TIRED OR HAVING LITTLE ENERGY: NOT AT ALL
SUM OF ALL RESPONSES TO PHQ QUESTIONS 1-9: 0
6. FEELING BAD ABOUT YOURSELF - OR THAT YOU ARE A FAILURE OR HAVE LET YOURSELF OR YOUR FAMILY DOWN: NOT AT ALL
8. MOVING OR SPEAKING SO SLOWLY THAT OTHER PEOPLE COULD HAVE NOTICED. OR THE OPPOSITE, BEING SO FIGETY OR RESTLESS THAT YOU HAVE BEEN MOVING AROUND A LOT MORE THAN USUAL: NOT AT ALL
3. TROUBLE FALLING OR STAYING ASLEEP: NOT AT ALL
SUM OF ALL RESPONSES TO PHQ QUESTIONS 1-9: 0
2. FEELING DOWN, DEPRESSED OR HOPELESS: NOT AT ALL
5. POOR APPETITE OR OVEREATING: NOT AT ALL
SUM OF ALL RESPONSES TO PHQ QUESTIONS 1-9: 0
SUM OF ALL RESPONSES TO PHQ QUESTIONS 1-9: 0
10. IF YOU CHECKED OFF ANY PROBLEMS, HOW DIFFICULT HAVE THESE PROBLEMS MADE IT FOR YOU TO DO YOUR WORK, TAKE CARE OF THINGS AT HOME, OR GET ALONG WITH OTHER PEOPLE: NOT DIFFICULT AT ALL
7. TROUBLE CONCENTRATING ON THINGS, SUCH AS READING THE NEWSPAPER OR WATCHING TELEVISION: NOT AT ALL
1. LITTLE INTEREST OR PLEASURE IN DOING THINGS: NOT AT ALL

## 2025-03-28 NOTE — PROGRESS NOTES
Chief Complaint   Patient presents with    Rash     Started about 6-8 months ago. Behind ears and on eye lids. Rash hurts. Pain is burning sensation. Sometimes notices clear discharge from rash. Has tried neosporin with little relief.        History obtained from chart review and the patient.    SUBJECTIVE:  Valeria Chen is a 60 y.o. female that presents today for rash    Rash    HPI:    Length of time Sx have been present - since late fall  Rash has gotten unchanged since initially starting  Affected areas - eyelids and behind ears  Inciting events or exposures prior to rash starting? No  Pruritic?  Yes  Erythematous?  Yes  Weeping or drainage?  No  History of Urticaria?  No  Fever?  No  Painful?  No    Review of Systems - General ROS: negative for - chills, fever or night sweats  Respiratory ROS: negative for - shortness of breath, stridor or wheezing    Has also had a cough for 3-4 weeks  Cough is productive of purulent phlegm  Cough is more of tickle in the throat  Denies any SOB    Age/Gender Health Maintenance    Lipid   Lab Results   Component Value Date    CHOL 122 01/06/2018    CHOL 230 (H) 09/02/2017    CHOL 215 (H) 05/20/2017     Lab Results   Component Value Date    TRIG 112 01/06/2018    TRIG 179 09/02/2017    TRIG 155 05/20/2017     Lab Results   Component Value Date    HDL 46 11/14/2023    HDL 50 12/04/2021    HDL 48 11/24/2020     No components found for: \"LDLCHOLESTEROL\", \"LDLCALC\"    DM Screen  Lab Results   Component Value Date    LABA1C 5.5 11/14/2023     Colon Cancer Screening - 3/5/12  Dr. Eagle  Lung Cancer Screening (Age 50 to 80 with 30 pack year hx, current smoker or quit within past 15 years) - n/a    Tetanus -every 10 years  Influenza Vaccine - needs  Pneumonia Vaccine - 65  Zostavax - 50  HPV Vaccine - n/a    Breast Cancer Screening - mammogram 12/17/21  Cervical Cancer Screening - needs  Osteoporosis Screening - 65    AAA Screening - n/a    Falls screening - n/a    Current

## 2025-06-12 ENCOUNTER — TELEPHONE (OUTPATIENT)
Dept: FAMILY MEDICINE CLINIC | Age: 60
End: 2025-06-12

## 2025-06-12 DIAGNOSIS — F17.210 CIGARETTE NICOTINE DEPENDENCE, UNCOMPLICATED: Primary | ICD-10-CM

## 2025-06-12 NOTE — PATIENT INSTRUCTIONS
follow-up, he or she will help you understand what to do next.  After a lung cancer screening, you can go back to your usual activities right away.  A lung cancer screening test can't tell if you have lung cancer. If your results are positive, your doctor can't tell whether an abnormal finding is a harmless nodule, cancer, or something else without doing more tests.  What can you do to help prevent lung cancer?  Some lung cancers can't be prevented. But if you smoke, quitting smoking is the best step you can take to prevent lung cancer. If you want to quit, your doctor can recommend medicines or other ways to help.  Follow-up care is a key part of your treatment and safety. Be sure to make and go to all appointments, and call your doctor if you are having problems. It's also a good idea to know your test results and keep a list of the medicines you take.  Where can you learn more?  Go to https://www.[employer]!.net/patientEd and enter Q940 to learn more about \"Learning About Lung Cancer Screening.\"  Current as of: October 25, 2024  Content Version: 14.5  © 7232-3546 [employer].   Care instructions adapted under license by [employer]. If you have questions about a medical condition or this instruction, always ask your healthcare professional. [employer] disclaims any warranty or liability for your use of this information.

## 2025-06-12 NOTE — TELEPHONE ENCOUNTER
Pt informed and verbalized understanding.   Pt is @ work she will call to schedule  Phone # given to [facility]

## 2025-06-12 NOTE — TELEPHONE ENCOUNTER
----- Message from MICK Franco CNP sent at 6/12/2024 10:24 AM EDT -----  Let Valeria know she is due to repeat her annual CT lung cancer screening. Order has been placed. Thanks!

## 2025-06-12 NOTE — TELEPHONE ENCOUNTER
Discussed with the patient the current USPSTF guidelines released March 9, 2021 for screening for lung cancer.    For adults aged 50 to 80 years who have a 20 pack-year smoking history and currently smoke or have quit within the past 15 years the grade B recommendation is to:  Screen for lung cancer with low-dose computed tomography (LDCT) every year.  Stop screening once a person has not smoked for 15 years or has a health problem that limits life expectancy or the ability to have lung surgery.    The patient  reports that she has been smoking cigarettes. She has a 30 pack-year smoking history. She has never used smokeless tobacco.. Discussed with patient the risks and benefits of screening, including over-diagnosis, false positive rate, and total radiation exposure.  The patient currently exhibits no signs or symptoms suggestive of lung cancer.  Discussed with patient the importance of compliance with yearly annual lung cancer screenings and willingness to undergo diagnosis and treatment if screening scan is positive.  In addition, the patient was counseled regarding the importance of remaining smoke free and/or total smoking cessation.    Also reviewed the following if the patient has Medicare that as of February 10, 2022, Medicare only covers LDCT screening in patients aged 50-77 with at least a 20 pack-year smoking history who currently smoke or have quit in the last 15 years

## 2025-06-21 ENCOUNTER — HOSPITAL ENCOUNTER (OUTPATIENT)
Age: 60
Discharge: HOME | End: 2025-06-21
Payer: COMMERCIAL

## 2025-06-21 DIAGNOSIS — I10 ESSENTIAL HYPERTENSION: ICD-10-CM

## 2025-06-21 LAB
ALBUMIN SERPL BCG-MCNC: 4 G/DL (ref 3.4–4.9)
ALP SERPL-CCNC: 89 U/L (ref 38–126)
ALT SERPL W/O P-5'-P-CCNC: 23 U/L (ref 10–35)
ANION GAP SERPL CALC-SCNC: 11 MEQ/L (ref 8–16)
AST SERPL-CCNC: 21 U/L (ref 10–35)
BASOPHILS ABSOLUTE: 0 THOU/MM3 (ref 0–0.1)
BASOPHILS NFR BLD AUTO: 0.4 %
BILIRUB SERPL-MCNC: 1 MG/DL (ref 0.3–1.2)
BUN SERPL-MCNC: 11 MG/DL (ref 8–23)
CALCIUM SERPL-MCNC: 9.7 MG/DL (ref 8.8–10.2)
CHLORIDE SERPL-SCNC: 109 MEQ/L (ref 98–111)
CHOLESTEROL, FASTING: 120 MG/DL (ref 100–199)
CO2 SERPL-SCNC: 25 MEQ/L (ref 22–29)
CREAT SERPL-MCNC: 0.8 MG/DL (ref 0.5–0.9)
CREAT UR-MCNC: 118 MG/DL
DEPRECATED RDW RBC AUTO: 47.2 FL (ref 35–45)
EOSINOPHIL NFR BLD AUTO: 3.1 %
EOSINOPHILS ABSOLUTE: 0.2 THOU/MM3 (ref 0–0.4)
ERYTHROCYTE [DISTWIDTH] IN BLOOD BY AUTOMATED COUNT: 13.3 % (ref 11.5–14.5)
GFR SERPL CREATININE-BSD FRML MDRD: 84 ML/MIN/1.73M2
GLUCOSE SERPL-MCNC: 85 MG/DL (ref 74–109)
HCT VFR BLD AUTO: 42.9 % (ref 37–47)
HDLC SERPL-MCNC: 50 MG/DL
HGB BLD-MCNC: 14.2 GM/DL (ref 12–16)
IMM GRANULOCYTES # BLD AUTO: 0.01 THOU/MM3 (ref 0–0.07)
IMM GRANULOCYTES NFR BLD AUTO: 0.2 %
LDLC SERPL CALC-MCNC: 51 MG/DL
LYMPHOCYTES ABSOLUTE: 1.8 THOU/MM3 (ref 1–4.8)
LYMPHOCYTES NFR BLD AUTO: 35.3 %
MCH RBC QN AUTO: 31.8 PG (ref 26–33)
MCHC RBC AUTO-ENTMCNC: 33.1 GM/DL (ref 32.2–35.5)
MCV RBC AUTO: 96.2 FL (ref 81–99)
MICROALBUMIN UR-MCNC: < 2 MG/DL
MICROALBUMIN/CREAT RATIO PNL UR: 17 MG/G (ref 0–30)
MONOCYTES ABSOLUTE: 0.3 THOU/MM3 (ref 0.4–1.3)
MONOCYTES NFR BLD AUTO: 6.4 %
NEUTROPHILS ABSOLUTE: 2.8 THOU/MM3 (ref 1.8–7.7)
NEUTROPHILS NFR BLD AUTO: 54.6 %
NRBC BLD AUTO-RTO: 0 /100 WBC
PLATELET # BLD AUTO: 283 THOU/MM3 (ref 130–400)
PMV BLD AUTO: 11.6 FL (ref 9.4–12.4)
POTASSIUM SERPL-SCNC: 4.5 MEQ/L (ref 3.5–5.2)
PROT SERPL-MCNC: 6.3 G/DL (ref 6.4–8.3)
RBC # BLD AUTO: 4.46 MILL/MM3 (ref 4.2–5.4)
SODIUM SERPL-SCNC: 145 MEQ/L (ref 135–145)
TRIGLYCERIDE, FASTING: 94 MG/DL (ref 0–199)
WBC # BLD AUTO: 5.2 THOU/MM3 (ref 4.8–10.8)

## 2025-06-21 PROCEDURE — 85025 COMPLETE CBC W/AUTO DIFF WBC: CPT

## 2025-06-21 PROCEDURE — 36415 COLL VENOUS BLD VENIPUNCTURE: CPT

## 2025-06-21 PROCEDURE — 80061 LIPID PANEL: CPT

## 2025-06-21 PROCEDURE — 82043 UR ALBUMIN QUANTITATIVE: CPT

## 2025-06-21 PROCEDURE — 80053 COMPREHEN METABOLIC PANEL: CPT

## 2025-06-23 ENCOUNTER — RESULTS FOLLOW-UP (OUTPATIENT)
Dept: FAMILY MEDICINE CLINIC | Age: 60
End: 2025-06-23

## 2025-06-23 NOTE — TELEPHONE ENCOUNTER
----- Message from MICK Franco CNP sent at 6/23/2025  9:55 AM EDT -----  Let Valeria know her labs are stable and in appropriate ranges.

## 2025-06-29 DIAGNOSIS — F33.42 RECURRENT MAJOR DEPRESSIVE DISORDER, IN FULL REMISSION: ICD-10-CM

## 2025-06-30 RX ORDER — BUPROPION HYDROCHLORIDE 300 MG/1
TABLET ORAL
Qty: 90 TABLET | Refills: 3 | Status: SHIPPED | OUTPATIENT
Start: 2025-06-30

## 2025-06-30 NOTE — TELEPHONE ENCOUNTER
Recent Visits  Date Type Provider Dept   03/28/25 Office Visit Kendrick Vale APRN - CNP [facility]   07/24/24 Office Visit Kendrick Vale APRN - CNP [facility]   05/16/24 Office Visit Kendrick Vale APRN - [facility]NP [facility]   02/15/24 Office Visit Kendrick Vale APRN - [facility]NP [facility]   Showing recent visits within past 540 days with a meds authorizing provider and meeting all other requirements  Future Appointments  Date Type Provider Dept   07/09/25 Appointment Kendrick Vale APRN - CNP [facility]   Showing future appointments within next 150 days with a meds authorizing provider and meeting all other requirements

## 2025-07-09 ENCOUNTER — RESULTS FOLLOW-UP (OUTPATIENT)
Dept: FAMILY MEDICINE CLINIC | Age: 60
End: 2025-07-09

## 2025-07-09 ENCOUNTER — HOSPITAL ENCOUNTER (OUTPATIENT)
Dept: CT IMAGING | Age: 60
Discharge: HOME | End: 2025-07-09
Payer: COMMERCIAL

## 2025-07-09 ENCOUNTER — OFFICE VISIT (OUTPATIENT)
Dept: FAMILY MEDICINE CLINIC | Age: 60
End: 2025-07-09

## 2025-07-09 VITALS
OXYGEN SATURATION: 98 % | HEIGHT: 64 IN | DIASTOLIC BLOOD PRESSURE: 82 MMHG | BODY MASS INDEX: 31.86 KG/M2 | SYSTOLIC BLOOD PRESSURE: 130 MMHG | RESPIRATION RATE: 20 BRPM | WEIGHT: 186.6 LBS | TEMPERATURE: 96.8 F | HEART RATE: 88 BPM

## 2025-07-09 DIAGNOSIS — E78.00 PURE HYPERCHOLESTEROLEMIA: ICD-10-CM

## 2025-07-09 DIAGNOSIS — F33.42 RECURRENT MAJOR DEPRESSIVE DISORDER, IN FULL REMISSION: ICD-10-CM

## 2025-07-09 DIAGNOSIS — I10 ESSENTIAL HYPERTENSION: ICD-10-CM

## 2025-07-09 DIAGNOSIS — F17.210 CIGARETTE NICOTINE DEPENDENCE, UNCOMPLICATED: ICD-10-CM

## 2025-07-09 DIAGNOSIS — J44.9 COPD, MILD (HCC): ICD-10-CM

## 2025-07-09 DIAGNOSIS — K21.9 GASTROESOPHAGEAL REFLUX DISEASE, UNSPECIFIED WHETHER ESOPHAGITIS PRESENT: ICD-10-CM

## 2025-07-09 DIAGNOSIS — Z12.11 SCREENING FOR COLON CANCER: Primary | ICD-10-CM

## 2025-07-09 PROCEDURE — 71271 CT THORAX LUNG CANCER SCR C-: CPT

## 2025-07-09 RX ORDER — ATORVASTATIN CALCIUM 80 MG/1
80 TABLET, FILM COATED ORAL DAILY
Qty: 90 TABLET | Refills: 3 | Status: SHIPPED | OUTPATIENT
Start: 2025-07-09

## 2025-07-09 RX ORDER — FLUOXETINE HYDROCHLORIDE 40 MG/1
40 CAPSULE ORAL DAILY
Qty: 90 CAPSULE | Refills: 3 | Status: SHIPPED | OUTPATIENT
Start: 2025-07-09

## 2025-07-09 RX ORDER — OMEPRAZOLE 40 MG/1
40 CAPSULE, DELAYED RELEASE ORAL DAILY
Qty: 90 CAPSULE | Refills: 3 | Status: SHIPPED | OUTPATIENT
Start: 2025-07-09

## 2025-07-09 RX ORDER — LISINOPRIL 10 MG/1
10 TABLET ORAL DAILY
Qty: 90 TABLET | Refills: 3 | Status: SHIPPED | OUTPATIENT
Start: 2025-07-09

## 2025-07-09 NOTE — PROGRESS NOTES
Intimate Partner Violence: Not on file   Housing Stability: Low Risk  (3/28/2025)    Housing Stability Vital Sign     Unable to Pay for Housing in the Last Year: No     Number of Times Moved in the Last Year: 0     Homeless in the Last Year: No       History reviewed. No pertinent family history.      I have reviewed the patient's past medical history, past surgical history, allergies, medications, social and family history and I have made updates where appropriate.      Review of Systems  Positive responses are highlighted in bold    Constitutional:  Fever, Chills, Night Sweats, Fatigue, Unexpected changes in weight  Eyes:  Eye discharge, Eye pain, Eye redness, Visual disturbances   HENT:  Ear pain, Tinnitus, Nosebleeds, Trouble swallowing, Hearing loss, Sore throat  Cardiovascular:  Chest Pain, Palpitations, Orthopnea, Paroxysmal Nocturnal Dyspnea  Respiratory:  Cough, Wheezing, Shortness of breath, Chest tightness, Apnea  Gastrointestinal:  Nausea, Vomiting, Diarrhea, Constipation, Heartburn, Blood in stool  Genitourinary:  Difficulty or painful urination, Flank pain, Change in frequency, Urgency  Skin:  Color change, Rash, Itching, Wound  Psychiatric:  Hallucinations, Anxiety, Depression, Suicidal ideation  Hematological:  Enlarged glands, Easy bleeding, Easily bruising  Musculoskeletal:  Joint pain, Back pain, Gait problems, Joint swelling, Myalgias  Neurological:  Dizziness, Headaches, Presyncope, Numbness, Seizures, Tremors  Allergy:  Environmental allergies, Food allergies  Endocrine:  Heat Intolerance, Cold Intolerance, Polydipsia, Polyphagia, Polyuria    Lab Results   Component Value Date     06/21/2025    K 4.5 06/21/2025     06/21/2025    CO2 25 06/21/2025    BUN 11 06/21/2025    CREATININE 0.8 06/21/2025    GLUCOSE 85 06/21/2025    CALCIUM 9.7 06/21/2025    BILITOT 1.0 06/21/2025    ALKPHOS 89 06/21/2025    AST 21 06/21/2025    ALT 23 06/21/2025    LABGLOM 84 06/21/2025     Lab Results